# Patient Record
Sex: MALE | Race: WHITE | NOT HISPANIC OR LATINO | URBAN - METROPOLITAN AREA
[De-identification: names, ages, dates, MRNs, and addresses within clinical notes are randomized per-mention and may not be internally consistent; named-entity substitution may affect disease eponyms.]

---

## 2023-02-22 LAB
ALANINE AMINOTRANSFERASE (SGPT) (U/L) IN SER/PLAS: 22 U/L (ref 10–52)
ALBUMIN (G/DL) IN SER/PLAS: 4.4 G/DL (ref 3.4–5)
ALKALINE PHOSPHATASE (U/L) IN SER/PLAS: 65 U/L (ref 33–136)
ANION GAP IN SER/PLAS: 11 MMOL/L (ref 10–20)
ASPARTATE AMINOTRANSFERASE (SGOT) (U/L) IN SER/PLAS: 17 U/L (ref 9–39)
BASOPHILS (10*3/UL) IN BLOOD BY AUTOMATED COUNT: 0.05 X10E9/L (ref 0–0.1)
BASOPHILS/100 LEUKOCYTES IN BLOOD BY AUTOMATED COUNT: 0.9 % (ref 0–2)
BILIRUBIN TOTAL (MG/DL) IN SER/PLAS: 1.1 MG/DL (ref 0–1.2)
CALCIUM (MG/DL) IN SER/PLAS: 9.4 MG/DL (ref 8.6–10.6)
CARBON DIOXIDE, TOTAL (MMOL/L) IN SER/PLAS: 30 MMOL/L (ref 21–32)
CHLORIDE (MMOL/L) IN SER/PLAS: 104 MMOL/L (ref 98–107)
CHOLESTEROL (MG/DL) IN SER/PLAS: 216 MG/DL (ref 0–199)
CHOLESTEROL IN HDL (MG/DL) IN SER/PLAS: 54.2 MG/DL
CHOLESTEROL/HDL RATIO: 4
CREATININE (MG/DL) IN SER/PLAS: 0.69 MG/DL (ref 0.5–1.3)
EOSINOPHILS (10*3/UL) IN BLOOD BY AUTOMATED COUNT: 0.15 X10E9/L (ref 0–0.4)
EOSINOPHILS/100 LEUKOCYTES IN BLOOD BY AUTOMATED COUNT: 2.8 % (ref 0–6)
ERYTHROCYTE DISTRIBUTION WIDTH (RATIO) BY AUTOMATED COUNT: 12.8 % (ref 11.5–14.5)
ERYTHROCYTE MEAN CORPUSCULAR HEMOGLOBIN CONCENTRATION (G/DL) BY AUTOMATED: 32.4 G/DL (ref 32–36)
ERYTHROCYTE MEAN CORPUSCULAR VOLUME (FL) BY AUTOMATED COUNT: 92 FL (ref 80–100)
ERYTHROCYTES (10*6/UL) IN BLOOD BY AUTOMATED COUNT: 4.88 X10E12/L (ref 4.5–5.9)
GFR MALE: >90 ML/MIN/1.73M2
GLUCOSE (MG/DL) IN SER/PLAS: 82 MG/DL (ref 74–99)
HEMATOCRIT (%) IN BLOOD BY AUTOMATED COUNT: 45.1 % (ref 41–52)
HEMOGLOBIN (G/DL) IN BLOOD: 14.6 G/DL (ref 13.5–17.5)
IMMATURE GRANULOCYTES/100 LEUKOCYTES IN BLOOD BY AUTOMATED COUNT: 0.2 % (ref 0–0.9)
LDL: 132 MG/DL (ref 0–99)
LEUKOCYTES (10*3/UL) IN BLOOD BY AUTOMATED COUNT: 5.3 X10E9/L (ref 4.4–11.3)
LYMPHOCYTES (10*3/UL) IN BLOOD BY AUTOMATED COUNT: 1.44 X10E9/L (ref 0.8–3)
LYMPHOCYTES/100 LEUKOCYTES IN BLOOD BY AUTOMATED COUNT: 27 % (ref 13–44)
MONOCYTES (10*3/UL) IN BLOOD BY AUTOMATED COUNT: 0.73 X10E9/L (ref 0.05–0.8)
MONOCYTES/100 LEUKOCYTES IN BLOOD BY AUTOMATED COUNT: 13.7 % (ref 2–10)
NEUTROPHILS (10*3/UL) IN BLOOD BY AUTOMATED COUNT: 2.96 X10E9/L (ref 1.6–5.5)
NEUTROPHILS/100 LEUKOCYTES IN BLOOD BY AUTOMATED COUNT: 55.4 % (ref 40–80)
NRBC (PER 100 WBCS) BY AUTOMATED COUNT: 0 /100 WBC (ref 0–0)
PLATELETS (10*3/UL) IN BLOOD AUTOMATED COUNT: 246 X10E9/L (ref 150–450)
POTASSIUM (MMOL/L) IN SER/PLAS: 4.5 MMOL/L (ref 3.5–5.3)
PROSTATE SPECIFIC AG (NG/ML) IN SER/PLAS: 3.75 NG/ML (ref 0–4)
PROTEIN TOTAL: 6.4 G/DL (ref 6.4–8.2)
SODIUM (MMOL/L) IN SER/PLAS: 140 MMOL/L (ref 136–145)
THYROTROPIN (MIU/L) IN SER/PLAS BY DETECTION LIMIT <= 0.05 MIU/L: 1.46 MIU/L (ref 0.44–3.98)
TRIGLYCERIDE (MG/DL) IN SER/PLAS: 147 MG/DL (ref 0–149)
UREA NITROGEN (MG/DL) IN SER/PLAS: 17 MG/DL (ref 6–23)
VLDL: 29 MG/DL (ref 0–40)

## 2023-10-20 ENCOUNTER — LAB (OUTPATIENT)
Dept: LAB | Facility: LAB | Age: 78
End: 2023-10-20
Payer: COMMERCIAL

## 2023-10-20 DIAGNOSIS — E03.9 HYPOTHYROIDISM, UNSPECIFIED: ICD-10-CM

## 2023-10-20 DIAGNOSIS — E03.9 HYPOTHYROIDISM, UNSPECIFIED: Primary | ICD-10-CM

## 2023-10-20 LAB
CHOLEST SERPL-MCNC: 244 MG/DL (ref 0–199)
CHOLESTEROL/HDL RATIO: 4.7
HDLC SERPL-MCNC: 51.7 MG/DL
LDLC SERPL CALC-MCNC: 161 MG/DL
NON HDL CHOLESTEROL: 192 MG/DL (ref 0–149)
TRIGL SERPL-MCNC: 156 MG/DL (ref 0–149)
VLDL: 31 MG/DL (ref 0–40)

## 2023-10-20 PROCEDURE — 80061 LIPID PANEL: CPT

## 2024-04-11 ENCOUNTER — OFFICE VISIT (OUTPATIENT)
Dept: PRIMARY CARE | Facility: CLINIC | Age: 79
End: 2024-04-11
Payer: MEDICARE

## 2024-04-11 VITALS
HEIGHT: 65 IN | HEART RATE: 70 BPM | BODY MASS INDEX: 28.49 KG/M2 | SYSTOLIC BLOOD PRESSURE: 115 MMHG | WEIGHT: 171 LBS | OXYGEN SATURATION: 93 % | TEMPERATURE: 97.8 F | RESPIRATION RATE: 18 BRPM | DIASTOLIC BLOOD PRESSURE: 74 MMHG

## 2024-04-11 DIAGNOSIS — R53.83 MALAISE AND FATIGUE: ICD-10-CM

## 2024-04-11 DIAGNOSIS — Z12.5 SCREENING PSA (PROSTATE SPECIFIC ANTIGEN): ICD-10-CM

## 2024-04-11 DIAGNOSIS — E78.5 DYSLIPIDEMIA: ICD-10-CM

## 2024-04-11 DIAGNOSIS — R53.81 MALAISE AND FATIGUE: ICD-10-CM

## 2024-04-11 DIAGNOSIS — I10 PRIMARY HYPERTENSION: ICD-10-CM

## 2024-04-11 DIAGNOSIS — Z00.00 ROUTINE GENERAL MEDICAL EXAMINATION AT HEALTH CARE FACILITY: Primary | ICD-10-CM

## 2024-04-11 DIAGNOSIS — G20.A1 IDIOPATHIC PARKINSON'S DISEASE (MULTI): ICD-10-CM

## 2024-04-11 DIAGNOSIS — K21.00 GASTROESOPHAGEAL REFLUX DISEASE WITH ESOPHAGITIS WITHOUT HEMORRHAGE: ICD-10-CM

## 2024-04-11 DIAGNOSIS — E55.9 VITAMIN D DEFICIENCY: ICD-10-CM

## 2024-04-11 LAB
25(OH)D3 SERPL-MCNC: 22 NG/ML (ref 30–100)
ALBUMIN SERPL BCP-MCNC: 3.8 G/DL (ref 3.4–5)
ALP SERPL-CCNC: 58 U/L (ref 33–136)
ALT SERPL W P-5'-P-CCNC: 20 U/L (ref 10–52)
ANION GAP SERPL CALC-SCNC: 15 MMOL/L (ref 10–20)
AST SERPL W P-5'-P-CCNC: 15 U/L (ref 9–39)
BILIRUB SERPL-MCNC: 0.9 MG/DL (ref 0–1.2)
BUN SERPL-MCNC: 20 MG/DL (ref 6–23)
CALCIUM SERPL-MCNC: 9.2 MG/DL (ref 8.6–10.6)
CHLORIDE SERPL-SCNC: 103 MMOL/L (ref 98–107)
CHOLEST SERPL-MCNC: 167 MG/DL (ref 0–199)
CHOLESTEROL/HDL RATIO: 3.1
CO2 SERPL-SCNC: 27 MMOL/L (ref 21–32)
CREAT SERPL-MCNC: 0.83 MG/DL (ref 0.5–1.3)
EGFRCR SERPLBLD CKD-EPI 2021: 88 ML/MIN/1.73M*2
ERYTHROCYTE [DISTWIDTH] IN BLOOD BY AUTOMATED COUNT: 13.1 % (ref 11.5–14.5)
GLUCOSE SERPL-MCNC: 72 MG/DL (ref 74–99)
HCT VFR BLD AUTO: 45.8 % (ref 41–52)
HDLC SERPL-MCNC: 53.7 MG/DL
HGB BLD-MCNC: 14.7 G/DL (ref 13.5–17.5)
LDLC SERPL CALC-MCNC: 91 MG/DL
MCH RBC QN AUTO: 29.9 PG (ref 26–34)
MCHC RBC AUTO-ENTMCNC: 32.1 G/DL (ref 32–36)
MCV RBC AUTO: 93 FL (ref 80–100)
NON HDL CHOLESTEROL: 113 MG/DL (ref 0–149)
NRBC BLD-RTO: 0 /100 WBCS (ref 0–0)
PLATELET # BLD AUTO: 230 X10*3/UL (ref 150–450)
POTASSIUM SERPL-SCNC: 4.3 MMOL/L (ref 3.5–5.3)
PROT SERPL-MCNC: 6.2 G/DL (ref 6.4–8.2)
PSA SERPL-MCNC: 4.08 NG/ML
RBC # BLD AUTO: 4.91 X10*6/UL (ref 4.5–5.9)
SODIUM SERPL-SCNC: 141 MMOL/L (ref 136–145)
TRIGL SERPL-MCNC: 110 MG/DL (ref 0–149)
TSH SERPL-ACNC: 1.97 MIU/L (ref 0.44–3.98)
VLDL: 22 MG/DL (ref 0–40)
WBC # BLD AUTO: 5 X10*3/UL (ref 4.4–11.3)

## 2024-04-11 PROCEDURE — 3074F SYST BP LT 130 MM HG: CPT | Performed by: FAMILY MEDICINE

## 2024-04-11 PROCEDURE — 1159F MED LIST DOCD IN RCRD: CPT | Performed by: FAMILY MEDICINE

## 2024-04-11 PROCEDURE — 1170F FXNL STATUS ASSESSED: CPT | Performed by: FAMILY MEDICINE

## 2024-04-11 PROCEDURE — G0103 PSA SCREENING: HCPCS

## 2024-04-11 PROCEDURE — 85027 COMPLETE CBC AUTOMATED: CPT

## 2024-04-11 PROCEDURE — 1126F AMNT PAIN NOTED NONE PRSNT: CPT | Performed by: FAMILY MEDICINE

## 2024-04-11 PROCEDURE — 80061 LIPID PANEL: CPT

## 2024-04-11 PROCEDURE — 36415 COLL VENOUS BLD VENIPUNCTURE: CPT

## 2024-04-11 PROCEDURE — G0439 PPPS, SUBSEQ VISIT: HCPCS | Performed by: FAMILY MEDICINE

## 2024-04-11 PROCEDURE — 99214 OFFICE O/P EST MOD 30 MIN: CPT | Performed by: FAMILY MEDICINE

## 2024-04-11 PROCEDURE — 3078F DIAST BP <80 MM HG: CPT | Performed by: FAMILY MEDICINE

## 2024-04-11 PROCEDURE — 80053 COMPREHEN METABOLIC PANEL: CPT

## 2024-04-11 PROCEDURE — 1036F TOBACCO NON-USER: CPT | Performed by: FAMILY MEDICINE

## 2024-04-11 PROCEDURE — 84443 ASSAY THYROID STIM HORMONE: CPT

## 2024-04-11 PROCEDURE — 82306 VITAMIN D 25 HYDROXY: CPT

## 2024-04-11 PROCEDURE — 1160F RVW MEDS BY RX/DR IN RCRD: CPT | Performed by: FAMILY MEDICINE

## 2024-04-11 RX ORDER — CARBIDOPA AND LEVODOPA 25; 100 MG/1; MG/1
1 TABLET ORAL 2 TIMES DAILY
COMMUNITY
Start: 2023-11-13

## 2024-04-11 RX ORDER — PANTOPRAZOLE SODIUM 40 MG/1
40 TABLET, DELAYED RELEASE ORAL DAILY
Qty: 30 TABLET | Refills: 5 | Status: SHIPPED | OUTPATIENT
Start: 2024-04-11 | End: 2024-10-08

## 2024-04-11 RX ORDER — AMANTADINE HYDROCHLORIDE 100 MG/1
100 CAPSULE, GELATIN COATED ORAL DAILY
COMMUNITY

## 2024-04-11 RX ORDER — ATORVASTATIN CALCIUM 20 MG/1
1 TABLET, FILM COATED ORAL DAILY
COMMUNITY
End: 2024-06-05

## 2024-04-11 ASSESSMENT — ACTIVITIES OF DAILY LIVING (ADL)
TAKING_MEDICATION: INDEPENDENT
DOING_HOUSEWORK: INDEPENDENT
MANAGING_FINANCES: INDEPENDENT
BATHING: INDEPENDENT
GROCERY_SHOPPING: INDEPENDENT
DRESSING: INDEPENDENT

## 2024-04-11 ASSESSMENT — PATIENT HEALTH QUESTIONNAIRE - PHQ9
2. FEELING DOWN, DEPRESSED OR HOPELESS: NOT AT ALL
1. LITTLE INTEREST OR PLEASURE IN DOING THINGS: NOT AT ALL
SUM OF ALL RESPONSES TO PHQ9 QUESTIONS 1 AND 2: 0

## 2024-04-11 ASSESSMENT — ENCOUNTER SYMPTOMS
OCCASIONAL FEELINGS OF UNSTEADINESS: 0
DEPRESSION: 0
LOSS OF SENSATION IN FEET: 0

## 2024-04-11 ASSESSMENT — PAIN SCALES - GENERAL: PAINLEVEL: 0-NO PAIN

## 2024-04-11 NOTE — PROGRESS NOTES
"Subjective   Reason for Visit: Tom Velasquez is an 79 y.o. male here for a Medicare Wellness visit.     Past Medical, Surgical, and Family History reviewed and updated in chart.    Reviewed all medications by prescribing practitioner or clinical pharmacist (such as prescriptions, OTCs, herbal therapies and supplements) and documented in the medical record.    HPI  : Patient is an 79-year-old male who was evaluated today for his Medicare wellness exam.  Patient will answer the questions as presented by the medical assistant today.  His wife states that he does have a living will and medical power of .  He mainly speaks Slov[place] and he is with his wife who does interpret for him.  He has idiopathic Parkinson's disease and does see neurology at the [facility].  He is on Sinemet and Symmetrel.  He is able to ambulate without any significant difficulty and he does have a mild tremor.  His concern today is with some reflux esophagitis and he does need to complete blood work.  Patient otherwise stays very active for the age of 79 and his wife states that he is always doing something to stay busy.  The medications do bother his stomach a little bit and it does cause him some fatigue.  He does follow every 4 to 6 months with neurology.    Patient Care Team:  Derrick Dodson DO as PCP - General (Family Medicine)  Jose Osman MD as PCP - [employer] PCP     Review of Systems    Objective   Vitals:  /74   Pulse 70   Temp 36.6 °C (97.8 °F)   Resp 18   Ht 1.651 m (5' 5\")   Wt 77.6 kg (171 lb)   SpO2 93%   BMI 28.46 kg/m²       Physical Exam  Vitals and nursing note reviewed.   Constitutional:       Appearance: Normal appearance. He is normal weight.      Comments: Patient is alert and oriented x3.   No acute distress but does have Parkinson's and has a mild tremor.   HENT:      Head: Normocephalic.      Right Ear: Tympanic membrane and external ear normal.      Left Ear: Tympanic " membrane and external ear normal.      Ears:      Comments: Ears are patent bilaterally and TMs are clear.     Nose: Nose normal.      Mouth/Throat:      Mouth: Mucous membranes are moist.      Pharynx: Oropharynx is clear.      Comments: Mouth is moist, tongue is midline.  No posterior pharyngeal erythema.  Eyes:      Extraocular Movements: Extraocular movements intact.      Conjunctiva/sclera: Conjunctivae normal.      Pupils: Pupils are equal, round, and reactive to light.      Comments: No visual disturbance, does have his eyes examined once a year.   Neck:      Comments: Mild restriction of motion cervical spine secondary to arthritis.  No carotid bruits, no thyromegaly, no cervical adenopathy.   Cardiovascular:      Rate and Rhythm: Normal rate and regular rhythm.      Pulses: Normal pulses.      Heart sounds: Normal heart sounds.      Comments: Patient denies chest pain and no palpitations.  Heart rhythm is stable S1 and S2 are noted, no ectopics.  Pulmonary:      Effort: Pulmonary effort is normal.      Breath sounds: Normal breath sounds.      Comments: Patient denies any coughing or wheezing.  Lungs are clear to auscultation.    Abdominal:      General: Bowel sounds are normal.      Palpations: Abdomen is soft.      Comments: Abdomen is soft and nontender with some epigastric reflux symptoms.  Patient denies lower abdominal pains or guarding.  No flank tenderness.  Patient would like some medication for reflux and heartburn.   Genitourinary:     Comments: Patient denies dysuria, no hematuria, no nocturia, denies flank pain.  Musculoskeletal:         General: Normal range of motion.      Cervical back: Normal range of motion.      Comments: Patient complains of some arthritis in his hips and knees but overall stays active and does try to keep busy at home.  Occasional cervical and lumbar muscle spasm.  Denies any radicular pain down the legs.  Ambulates without any assistive device.  No ankle edema.    Skin:     General: Skin is warm and dry.      Comments: There is no bruising, no erythema, no rashes.  Patient does have dry skin.  Few scattered keratotic age spots.   Neurological:      General: No focal deficit present.      Mental Status: He is alert and oriented to person, place, and time. Mental status is at baseline.      Comments: Patient does have a Parkinson's tremor but it is very mild in the upper extremities.  If he gets nervous the tremors get worse.  Can get up from a seated position and ambulation is very good with his current medications.  No focal neurosensory deficits are noted.  Patient denies any peripheral neuropathy.  Coordination and gait are stable.  Normal muscle strength upper and lower extremities.   Psychiatric:         Behavior: Behavior normal.         Thought Content: Thought content normal.         Judgment: Judgment normal.      Comments: Patient has flat mood and affect.  Patient also has Parkinson's facies.  Thought content and judgment are stable.  Memory and speech seem intact.  No signs of vascular dementia.  Behavior is normal.     PLAN : Patient is an 79-year-old male who was evaluated today for a Medicare wellness exam.  He did have complete blood work drawn and will be notified of the results and 4 days.  He did answer the questions as presented by the medical assistant and he does have a living will and medical power of .  He mainly speaks Upper sorbian and his wife is with him to interpret.  He also goes to the [facility] for his Parkinson's disease which is very mild.  I reviewed his previous blood results and his medications and he will follow-up as needed pending his blood work results.  He otherwise is stable and will call to make appointments as needed.    Assessment/Plan   Problem List Items Addressed This Visit    None  Visit Diagnoses       Routine general medical examination at health care facility    -  Primary    Primary hypertension        Relevant  Orders    CBC    Comprehensive Metabolic Panel    Lipid Panel    Thyroid Stimulating Hormone    Dyslipidemia        Relevant Orders    CBC    Comprehensive Metabolic Panel    Lipid Panel    Thyroid Stimulating Hormone    Malaise and fatigue        Relevant Orders    CBC    Comprehensive Metabolic Panel    Lipid Panel    Thyroid Stimulating Hormone    Vitamin D deficiency        Relevant Orders    Vitamin D 25-Hydroxy,Total (for eval of Vitamin D levels)    Screening PSA (prostate specific antigen)        Relevant Orders    Prostate Specific Antigen, Screen    Gastroesophageal reflux disease with esophagitis without hemorrhage

## 2024-04-12 DIAGNOSIS — E55.9 VITAMIN D DEFICIENCY: Primary | ICD-10-CM

## 2024-04-12 RX ORDER — ERGOCALCIFEROL 1.25 MG/1
50000 CAPSULE ORAL
Qty: 4 CAPSULE | Refills: 1 | Status: SHIPPED | OUTPATIENT
Start: 2024-04-14 | End: 2024-06-03

## 2024-04-12 NOTE — RESULT ENCOUNTER NOTE
Blood sugar was a little bit low at 72    probably from fasting for the blood test  .    Kidney and liver function are normal         protein was a little bit low at 6.2     that is probably dietary and he should try to  eat  a   little more protein     vitamin D is low at 22     it should be above 30    I will send in some prescription vitamin D to his pharmacy    to take once per week       his PSA level is just borderline     at 4.08     and should be under 4         I will discuss this and recheck it next time he comes in      it is probably due to his age.     Cholesterol is very good at 167       triglycerides are normal at 110    thyroid function is normal         red and white blood cell counts are normal  -overall blood work is very stable for his age             and I will just send in some prescription vitamin D to take once per week

## 2024-06-01 DIAGNOSIS — E55.9 VITAMIN D DEFICIENCY: ICD-10-CM

## 2024-06-03 RX ORDER — ERGOCALCIFEROL 1.25 MG/1
50000 CAPSULE ORAL
Qty: 4 CAPSULE | Refills: 1 | Status: SHIPPED | OUTPATIENT
Start: 2024-06-09

## 2024-06-04 DIAGNOSIS — E78.5 DYSLIPIDEMIA: Primary | ICD-10-CM

## 2024-06-05 RX ORDER — ATORVASTATIN CALCIUM 20 MG/1
20 TABLET, FILM COATED ORAL DAILY
Qty: 90 TABLET | Refills: 3 | Status: SHIPPED | OUTPATIENT
Start: 2024-06-05

## 2024-07-15 ENCOUNTER — APPOINTMENT (OUTPATIENT)
Dept: PRIMARY CARE | Facility: CLINIC | Age: 79
End: 2024-07-15
Payer: MEDICARE

## 2024-07-15 VITALS
TEMPERATURE: 97.2 F | HEART RATE: 72 BPM | OXYGEN SATURATION: 95 % | RESPIRATION RATE: 18 BRPM | HEIGHT: 65 IN | SYSTOLIC BLOOD PRESSURE: 119 MMHG | WEIGHT: 176 LBS | BODY MASS INDEX: 29.32 KG/M2 | DIASTOLIC BLOOD PRESSURE: 71 MMHG

## 2024-07-15 DIAGNOSIS — R53.83 MALAISE AND FATIGUE: ICD-10-CM

## 2024-07-15 DIAGNOSIS — E78.5 DYSLIPIDEMIA: Primary | ICD-10-CM

## 2024-07-15 DIAGNOSIS — G20.A1 IDIOPATHIC PARKINSON'S DISEASE (MULTI): ICD-10-CM

## 2024-07-15 DIAGNOSIS — I10 PRIMARY HYPERTENSION: ICD-10-CM

## 2024-07-15 DIAGNOSIS — R97.20 ELEVATED PSA: ICD-10-CM

## 2024-07-15 DIAGNOSIS — N32.81 OVERACTIVE BLADDER: ICD-10-CM

## 2024-07-15 DIAGNOSIS — R53.81 MALAISE AND FATIGUE: ICD-10-CM

## 2024-07-15 LAB
ALBUMIN SERPL BCP-MCNC: 4.1 G/DL (ref 3.4–5)
ALP SERPL-CCNC: 64 U/L (ref 33–136)
ALT SERPL W P-5'-P-CCNC: 13 U/L (ref 10–52)
ANION GAP SERPL CALC-SCNC: 13 MMOL/L (ref 10–20)
AST SERPL W P-5'-P-CCNC: 16 U/L (ref 9–39)
BILIRUB SERPL-MCNC: 0.8 MG/DL (ref 0–1.2)
BUN SERPL-MCNC: 20 MG/DL (ref 6–23)
CALCIUM SERPL-MCNC: 9.2 MG/DL (ref 8.6–10.6)
CHLORIDE SERPL-SCNC: 102 MMOL/L (ref 98–107)
CHOLEST SERPL-MCNC: 152 MG/DL (ref 0–199)
CHOLESTEROL/HDL RATIO: 2.8
CO2 SERPL-SCNC: 28 MMOL/L (ref 21–32)
CREAT SERPL-MCNC: 0.78 MG/DL (ref 0.5–1.3)
EGFRCR SERPLBLD CKD-EPI 2021: 90 ML/MIN/1.73M*2
ERYTHROCYTE [DISTWIDTH] IN BLOOD BY AUTOMATED COUNT: 12.9 % (ref 11.5–14.5)
GLUCOSE SERPL-MCNC: 94 MG/DL (ref 74–99)
HCT VFR BLD AUTO: 47.4 % (ref 41–52)
HDLC SERPL-MCNC: 53.4 MG/DL
HGB BLD-MCNC: 14.9 G/DL (ref 13.5–17.5)
LDLC SERPL CALC-MCNC: 61 MG/DL
MCH RBC QN AUTO: 29.4 PG (ref 26–34)
MCHC RBC AUTO-ENTMCNC: 31.4 G/DL (ref 32–36)
MCV RBC AUTO: 94 FL (ref 80–100)
NON HDL CHOLESTEROL: 99 MG/DL (ref 0–149)
NRBC BLD-RTO: 0 /100 WBCS (ref 0–0)
PLATELET # BLD AUTO: 245 X10*3/UL (ref 150–450)
POTASSIUM SERPL-SCNC: 4.2 MMOL/L (ref 3.5–5.3)
PROT SERPL-MCNC: 6.3 G/DL (ref 6.4–8.2)
RBC # BLD AUTO: 5.07 X10*6/UL (ref 4.5–5.9)
SODIUM SERPL-SCNC: 139 MMOL/L (ref 136–145)
TRIGL SERPL-MCNC: 187 MG/DL (ref 0–149)
VLDL: 37 MG/DL (ref 0–40)
WBC # BLD AUTO: 5.3 X10*3/UL (ref 4.4–11.3)

## 2024-07-15 PROCEDURE — 1160F RVW MEDS BY RX/DR IN RCRD: CPT | Performed by: FAMILY MEDICINE

## 2024-07-15 PROCEDURE — 36415 COLL VENOUS BLD VENIPUNCTURE: CPT

## 2024-07-15 PROCEDURE — 3074F SYST BP LT 130 MM HG: CPT | Performed by: FAMILY MEDICINE

## 2024-07-15 PROCEDURE — 80053 COMPREHEN METABOLIC PANEL: CPT

## 2024-07-15 PROCEDURE — 99214 OFFICE O/P EST MOD 30 MIN: CPT | Performed by: FAMILY MEDICINE

## 2024-07-15 PROCEDURE — 85027 COMPLETE CBC AUTOMATED: CPT

## 2024-07-15 PROCEDURE — 80061 LIPID PANEL: CPT

## 2024-07-15 PROCEDURE — 1036F TOBACCO NON-USER: CPT | Performed by: FAMILY MEDICINE

## 2024-07-15 PROCEDURE — 1126F AMNT PAIN NOTED NONE PRSNT: CPT | Performed by: FAMILY MEDICINE

## 2024-07-15 PROCEDURE — 1159F MED LIST DOCD IN RCRD: CPT | Performed by: FAMILY MEDICINE

## 2024-07-15 PROCEDURE — 3078F DIAST BP <80 MM HG: CPT | Performed by: FAMILY MEDICINE

## 2024-07-15 ASSESSMENT — PATIENT HEALTH QUESTIONNAIRE - PHQ9
SUM OF ALL RESPONSES TO PHQ9 QUESTIONS 1 AND 2: 0
2. FEELING DOWN, DEPRESSED OR HOPELESS: NOT AT ALL
1. LITTLE INTEREST OR PLEASURE IN DOING THINGS: NOT AT ALL

## 2024-07-15 ASSESSMENT — PAIN SCALES - GENERAL: PAINLEVEL: 0-NO PAIN

## 2024-07-15 NOTE — PROGRESS NOTES
Subjective   Tom Velasquez is a 79 y.o. male who presents for Follow-up (Follow up visit to check cholesterol level).    HPI ; patient is an 79-year-old male present for follow-up visit and recheck on cholesterol and discussion about his Parkinson's disease.  He also had a mildly elevated PSA last visit and he wants to discuss seeing a urologist.  He would like a referral since he has overactive bladder issues and nocturia.  He has a mild right tremor in the right arm and does have Parkinson's type facies, he is on Sinemet from neurology.      Objective  : ROS :10 systems were reviewed and the information is included in the HPI and no additional review of systems is indicated.    Physical Exam  Vitals and nursing note reviewed.   Constitutional:       Appearance: Normal appearance.      Comments: Patient is alert and oriented x3.   No acute distress   HENT:      Head: Normocephalic.      Right Ear: Tympanic membrane and external ear normal.      Left Ear: Tympanic membrane and external ear normal.      Ears:      Comments: Ears are patent bilaterally and TMs are clear.     Nose: Nose normal.      Mouth/Throat:      Mouth: Mucous membranes are moist.      Pharynx: Oropharynx is clear.      Comments: Mouth is moist, tongue is midline.  No posterior pharyngeal erythema.  Eyes:      Extraocular Movements: Extraocular movements intact.      Conjunctiva/sclera: Conjunctivae normal.      Pupils: Pupils are equal, round, and reactive to light.      Comments: No visual disturbance, does have his eyes examined once a year.   Neck:      Comments: Moderate restriction of motion cervical spine due to arthritis, and spasm.  No carotid bruits, no thyromegaly, no cervical adenopathy.    Cardiovascular:      Rate and Rhythm: Normal rate and regular rhythm.      Pulses: Normal pulses.      Heart sounds: Normal heart sounds.      Comments: Patient denies chest pain and no palpitations.  Heart rhythm is stable S1 and S2 are noted, no  ectopics.  Pulmonary:      Effort: Pulmonary effort is normal.      Breath sounds: Normal breath sounds.      Comments: Denies any shortness of breath.  Patient denies any coughing or wheezing.  Lungs are clear to auscultation.    Abdominal:      General: Bowel sounds are normal.      Palpations: Abdomen is soft.      Comments: Abdomen is soft and nontender,  No flank tenderness.  No suprapubic pain.  Positive bowel sounds x4.  No abdominal guarding and no rebound tenderness.   Genitourinary:     Comments: Slightly elevated PSA and patient does have overactive bladder and nocturia.  Will refer to urology.  Musculoskeletal:         General: Normal range of motion.      Cervical back: Normal range of motion.      Comments: Age-related arthritis in the joints.  Patient does try to exercise on a regular basis due to his Parkinson's disease.  Mild restriction of motion cervical and lumbar spines due to arthritis, and muscle spasm.   Skin:     General: Skin is warm.      Comments: There is no bruising, no erythema, no skin lesions noted, no rashes.   Neurological:      General: No focal deficit present.      Mental Status: He is alert and oriented to person, place, and time. Mental status is at baseline.      Comments: History of Parkinson's disease and has a right arm tremor and Parkinson's facies.  He does follow with neurology and is on Sinemet.  Ambulation is stable and there is no tripping or stumbling.   Psychiatric:         Mood and Affect: Mood normal.         Behavior: Behavior normal.         Thought Content: Thought content normal.         Judgment: Judgment normal.      Comments: Patient has flat mood and affect.  Thought content and judgment are stable.  No signs of vascular dementia.  Behavior is normal.     PLAN : Patient is an 79-year-old male who was reevaluated today in follow-up to check his cholesterol, blood counts and review of his medications.  He did have a slightly elevated PSA in the past and  will be referred to allergy.  He also has overactive bladder and some nocturia.  He is accompanied with his wife and since he has Parkinson's disease he does follow with neurology and is on Sinemet.  He does have a mild right hand and arm tremor.  His balance is stable on the is not tripping or stumbling.  He does not need any assistive device.  He will be notified of his blood results when available and follow-up in 6 months pending the blood work results.    Problem List Items Addressed This Visit       Malaise and fatigue    Relevant Orders    CBC    Comprehensive Metabolic Panel    Dyslipidemia    Relevant Orders    CBC    Comprehensive Metabolic Panel    Primary hypertension    Relevant Orders    CBC    Comprehensive Metabolic Panel            Derrick Dodson, DO

## 2024-07-18 NOTE — RESULT ENCOUNTER NOTE
Blood sugar, kidney and liver function are normal       cholesterol is good at 152     triglycerides are borderline at 187    should be under 150     just watch the fats in the diet.    Red   And white blood cell counts are normal       blood work looks stable     just watch the fats in the diet.    He does not need to return for 6 months

## 2024-08-23 PROBLEM — H61.21 IMPACTED CERUMEN OF RIGHT EAR: Status: ACTIVE | Noted: 2024-08-23

## 2024-08-23 PROBLEM — H93.8X1 SENSATION OF PLUGGED EAR ON RIGHT SIDE: Status: ACTIVE | Noted: 2024-08-23

## 2024-08-23 PROBLEM — M25.519 SHOULDER PAIN: Status: ACTIVE | Noted: 2024-08-23

## 2024-08-23 PROBLEM — G20.A1 IDIOPATHIC PARKINSON'S DISEASE (MULTI): Status: ACTIVE | Noted: 2022-09-05

## 2024-08-23 PROBLEM — H90.3 BILATERAL SENSORINEURAL HEARING LOSS: Status: ACTIVE | Noted: 2024-08-23

## 2024-08-23 PROBLEM — M79.2 NERVE PAIN: Status: ACTIVE | Noted: 2024-08-23

## 2024-08-23 RX ORDER — TAMSULOSIN HYDROCHLORIDE 0.4 MG/1
1 CAPSULE ORAL
COMMUNITY
Start: 2023-11-08

## 2024-08-24 NOTE — PROGRESS NOTES
Subjective   Patient ID: Tom Velasquez is a 79 y.o. male who presents for No chief complaint on file..    HPI  PT PRESENTS FOR EVALUATION OF VOIDING DYSFUNCTION  Are you experiencing:  Burning on urination -- NO  Pain on urination  --NO  Urinary frequency -- NO  Urinary urgency -- NO  Urge incontinence -- NO  NO ENEURESIS   Nocturia-- 2-3 X ON AVG  Hematuria -- NO  Hesitancy --NO  Post void fullness --   NO    Review of Systems  General-- No C/O fever or chills  Head-- No C/O Dizziness  Eyes-- NO  C/O blurry or double vision  Ears-- No C/O hearing loss  Neck-- Supple  Chest-- No C/O pain or discomfort  Lungs-- No C/O shortness of breath  Abdomen-- No C/O  pain or discomfort, No nausea or vomiting  Back-- No C/O back pain or discomfort  Extremities-- No C/O swelling or pain    Objective   Physical Exam    General-- well developed, well nourished in NAD  Head-- normal cephalic, atraumatic  Eyes-- PERRL, EOM'S FROM,  no  jaundice  Neck-- Supple, without masses  Chest-- Normal bony structure  Abdomen-- soft, non tender, liver spleen not palpable . No supra pubic masses  Back-- no flank masses palpable, no CVA tenderness on palpation or perc;ussion  Lymph nodes-- No inguinal lymphadenopathy noted  Prostate-- 3+, firm, smooth, non tender,without nodules  Testis-- both down, non tender, without masses  Epididymis-- no masses palpable  Scrotum -- no hydrocele noted  Extremities -- Normal muscle mass and tone for the patients age  Neurological-- oriented times three--PT HAS A TREMOR OF BOTH HANDS      PSA  4-11-24 -- 4.08  PVR-- 0 ML    Urinalysis dipstick--grossly within normal limits    Assessment/Plan   A:  NORMAL FEELING PROSTATE with mild voiding symptoms  NORMAL PVR  NORMAL U/A  PSA is at the upper limits of normal  There is no history of prostate cancer in the family  Pathophysiology of the above discussed in detail with the patient and his son  Options of further therapy and/or evaluation discussed in detail  All  questions answered    H/O PARKINSON'S-- THIS WILL OCC CAUSE VOIDING DYSFUNCTION  P:  REASSURANCE, EDUCATION RENDERED TO THE PT  We will treat the patient's current voiding symptoms AND THE PSA  conservatively for now--both the patient and son are comfortable with this plan of therapy  WILL SEE THE PT BACK ON AN OPEN DOOR POLICY  Patient will follow-up with Dr. Dodson per his routine     Chente Whaley MD 08/24/24 3:33 PM

## 2024-08-26 ENCOUNTER — OFFICE VISIT (OUTPATIENT)
Dept: UROLOGY | Facility: CLINIC | Age: 79
End: 2024-08-26
Payer: MEDICARE

## 2024-08-26 VITALS
HEIGHT: 65 IN | BODY MASS INDEX: 29.16 KG/M2 | WEIGHT: 175 LBS | DIASTOLIC BLOOD PRESSURE: 73 MMHG | RESPIRATION RATE: 16 BRPM | SYSTOLIC BLOOD PRESSURE: 137 MMHG | TEMPERATURE: 98.4 F | HEART RATE: 94 BPM

## 2024-08-26 DIAGNOSIS — R97.20 ELEVATED PSA: ICD-10-CM

## 2024-08-26 DIAGNOSIS — N13.8 BPH WITH URINARY OBSTRUCTION: Primary | ICD-10-CM

## 2024-08-26 DIAGNOSIS — N32.81 OVERACTIVE BLADDER: ICD-10-CM

## 2024-08-26 DIAGNOSIS — N40.1 BPH WITH URINARY OBSTRUCTION: Primary | ICD-10-CM

## 2024-08-26 DIAGNOSIS — Z13.89 SCREENING FOR BLOOD OR PROTEIN IN URINE: ICD-10-CM

## 2024-08-26 DIAGNOSIS — R35.1 NOCTURIA: ICD-10-CM

## 2024-08-26 LAB
POC APPEARANCE, URINE: CLEAR
POC BILIRUBIN, URINE: ABNORMAL
POC BLOOD, URINE: NEGATIVE
POC COLOR, URINE: YELLOW
POC GLUCOSE, URINE: NEGATIVE MG/DL
POC KETONES, URINE: ABNORMAL MG/DL
POC LEUKOCYTES, URINE: NEGATIVE
POC NITRITE,URINE: NEGATIVE
POC PH, URINE: 6 PH
POC PROTEIN, URINE: NEGATIVE MG/DL
POC SPECIFIC GRAVITY, URINE: >=1.03
POC UROBILINOGEN, URINE: 0.2 EU/DL

## 2024-08-26 PROCEDURE — 1036F TOBACCO NON-USER: CPT | Performed by: UROLOGY

## 2024-08-26 PROCEDURE — 51798 US URINE CAPACITY MEASURE: CPT | Performed by: UROLOGY

## 2024-08-26 PROCEDURE — 99204 OFFICE O/P NEW MOD 45 MIN: CPT | Performed by: UROLOGY

## 2024-08-26 PROCEDURE — 1160F RVW MEDS BY RX/DR IN RCRD: CPT | Performed by: UROLOGY

## 2024-08-26 PROCEDURE — 99214 OFFICE O/P EST MOD 30 MIN: CPT | Performed by: UROLOGY

## 2024-08-26 PROCEDURE — 1159F MED LIST DOCD IN RCRD: CPT | Performed by: UROLOGY

## 2024-08-26 PROCEDURE — 1126F AMNT PAIN NOTED NONE PRSNT: CPT | Performed by: UROLOGY

## 2024-08-26 PROCEDURE — 3075F SYST BP GE 130 - 139MM HG: CPT | Performed by: UROLOGY

## 2024-08-26 PROCEDURE — 81003 URINALYSIS AUTO W/O SCOPE: CPT | Performed by: UROLOGY

## 2024-08-26 PROCEDURE — 3078F DIAST BP <80 MM HG: CPT | Performed by: UROLOGY

## 2024-08-26 SDOH — ECONOMIC STABILITY: FOOD INSECURITY: WITHIN THE PAST 12 MONTHS, THE FOOD YOU BOUGHT JUST DIDN'T LAST AND YOU DIDN'T HAVE MONEY TO GET MORE.: NEVER TRUE

## 2024-08-26 SDOH — ECONOMIC STABILITY: FOOD INSECURITY: WITHIN THE PAST 12 MONTHS, YOU WORRIED THAT YOUR FOOD WOULD RUN OUT BEFORE YOU GOT MONEY TO BUY MORE.: NEVER TRUE

## 2024-08-26 ASSESSMENT — PAIN SCALES - GENERAL: PAINLEVEL: 0-NO PAIN

## 2024-08-26 ASSESSMENT — LIFESTYLE VARIABLES
HOW OFTEN DO YOU HAVE A DRINK CONTAINING ALCOHOL: NEVER
HOW MANY STANDARD DRINKS CONTAINING ALCOHOL DO YOU HAVE ON A TYPICAL DAY: PATIENT DOES NOT DRINK
SKIP TO QUESTIONS 9-10: 1
AUDIT-C TOTAL SCORE: 0
HOW OFTEN DO YOU HAVE SIX OR MORE DRINKS ON ONE OCCASION: NEVER

## 2024-08-26 ASSESSMENT — COLUMBIA-SUICIDE SEVERITY RATING SCALE - C-SSRS
6. HAVE YOU EVER DONE ANYTHING, STARTED TO DO ANYTHING, OR PREPARED TO DO ANYTHING TO END YOUR LIFE?: NO
1. IN THE PAST MONTH, HAVE YOU WISHED YOU WERE DEAD OR WISHED YOU COULD GO TO SLEEP AND NOT WAKE UP?: NO
2. HAVE YOU ACTUALLY HAD ANY THOUGHTS OF KILLING YOURSELF?: NO

## 2024-08-26 ASSESSMENT — PATIENT HEALTH QUESTIONNAIRE - PHQ9
2. FEELING DOWN, DEPRESSED OR HOPELESS: NOT AT ALL
SUM OF ALL RESPONSES TO PHQ9 QUESTIONS 1 AND 2: 0
1. LITTLE INTEREST OR PLEASURE IN DOING THINGS: NOT AT ALL

## 2024-08-26 ASSESSMENT — ENCOUNTER SYMPTOMS
OCCASIONAL FEELINGS OF UNSTEADINESS: 0
LOSS OF SENSATION IN FEET: 0

## 2024-08-26 NOTE — PROGRESS NOTES
Subjective   Patient ID: Tom Velasquez is a 79 y.o. male who presents for Urinary Frequency (Patient presents for urinary frequency.).    HPI  PT PRESENTS FOR EVALUATION OF VOIDING DYSFUNCTION  Are you experiencing:  Burning on urination -- NO  Pain on urination  --NO  Urinary frequency -- NO  Urinary urgency -- NO  Urge incontinence -- NO  NO ENEURESIS   Nocturia-- 2-3 X ON AVG  Hematuria -- NO  Hesitancy --NO  Post void fullness --   NO    Review of Systems  General-- No C/O fever or chills  Head-- No C/O Dizziness  Eyes-- NO  C/O blurry or double vision  Ears-- No C/O hearing loss  Neck-- Supple  Chest-- No C/O pain or discomfort  Lungs-- No C/O shortness of breath  Abdomen-- No C/O  pain or discomfort, No nausea or vomiting  Back-- No C/O back pain or discomfort  Extremities-- No C/O swelling or pain    Objective   Physical Exam    General-- well developed, well nourished in NAD  Head-- normal cephalic, atraumatic  Eyes-- PERRL, EOM'S FROM,  no  jaundice  Neck-- Supple, without masses  Chest-- Normal bony structure  Abdomen-- soft, non tender, liver spleen not palpable . No supra pubic masses  Back-- no flank masses palpable, no CVA tenderness on palpation or perc;ussion  Lymph nodes-- No inguinal lymphadenopathy noted  Prostate-- 3+, firm, smooth, non tender,without nodules  Testis-- both down, non tender, without masses  Epididymis-- no masses palpable  Scrotum -- no hydrocele noted  Extremities -- Normal muscle mass and tone for the patients age  Neurological-- oriented times three--PT HAS A TREMOR OF BOTH HANDS    PVR-- 0 ML    Assessment/Plan   A:  NORMAL FEELING PROSTATE  NORMAL PVR  NORMAL U/A    H/O PARKINSON'S-- THIS WILL OCC CAUSE VOIDING DYSFUNCTION  P:  REASSURANCE, EDUCATION RENDERED TO THE PT  WILL SEE THE PT BACK ON AN OPEN DOOR POLICY     Antonia Yee LPN 08/26/24 12:32 PM

## 2024-08-26 NOTE — LETTER
August 30, 2024     Derrick Dodson DO  [address] [facility] [id number]    Patient: Tom Velasquez   YOB: 1945   Date of Visit: 8/26/2024       Dear Dr. Derrick Dodson DO:    Thank you for referring Tom Velasquez to me for evaluation. Below are my notes for this consultation.  If you have questions, please do not hesitate to call me. I look forward to following your patient along with you.       Sincerely,     Chente Whaley MD      CC: No Recipients  ______________________________________________________________________________________    Subjective  Patient ID: Tom Velasquez is a 79 y.o. male who presents for No chief complaint on file..    HPI  PT PRESENTS FOR EVALUATION OF VOIDING DYSFUNCTION  Are you experiencing:  Burning on urination -- NO  Pain on urination  --NO  Urinary frequency -- NO  Urinary urgency -- NO  Urge incontinence -- NO  NO ENEURESIS   Nocturia-- 2-3 X ON AVG  Hematuria -- NO  Hesitancy --NO  Post void fullness --   NO    Review of Systems  General-- No C/O fever or chills  Head-- No C/O Dizziness  Eyes-- NO  C/O blurry or double vision  Ears-- No C/O hearing loss  Neck-- Supple  Chest-- No C/O pain or discomfort  Lungs-- No C/O shortness of breath  Abdomen-- No C/O  pain or discomfort, No nausea or vomiting  Back-- No C/O back pain or discomfort  Extremities-- No C/O swelling or pain    Objective   Physical Exam    General-- well developed, well nourished in NAD  Head-- normal cephalic, atraumatic  Eyes-- PERRL, EOM'S FROM,  no  jaundice  Neck-- Supple, without masses  Chest-- Normal bony structure  Abdomen-- soft, non tender, liver spleen not palpable . No supra pubic masses  Back-- no flank masses palpable, no CVA tenderness on palpation or perc;ussion  Lymph nodes-- No inguinal lymphadenopathy noted  Prostate-- 3+, firm, smooth, non tender,without nodules  Testis-- both down, non tender, without masses  Epididymis-- no masses palpable  Scrotum -- no hydrocele  noted  Extremities -- Normal muscle mass and tone for the patients age  Neurological-- oriented times three--PT HAS A TREMOR OF BOTH HANDS      PSA  4-11-24 -- 4.08  PVR-- 0 ML    Urinalysis dipstick--grossly within normal limits    Assessment/Plan   A:  NORMAL FEELING PROSTATE with mild voiding symptoms  NORMAL PVR  NORMAL U/A  PSA is at the upper limits of normal  There is no history of prostate cancer in the family  Pathophysiology of the above discussed in detail with the patient and his son  Options of further therapy and/or evaluation discussed in detail  All questions answered    H/O PARKINSON'S-- THIS WILL OCC CAUSE VOIDING DYSFUNCTION  P:  REASSURANCE, EDUCATION RENDERED TO THE PT  We will treat the patient's current voiding symptoms AND THE PSA  conservatively for now--both the patient and son are comfortable with this plan of therapy  WILL SEE THE PT BACK ON AN OPEN DOOR POLICY  Patient will follow-up with Dr. Dodson per his routine     Chente Whaley MD 08/24/24 3:33 PM

## 2024-10-01 DIAGNOSIS — K21.00 GASTROESOPHAGEAL REFLUX DISEASE WITH ESOPHAGITIS WITHOUT HEMORRHAGE: ICD-10-CM

## 2024-10-01 RX ORDER — PANTOPRAZOLE SODIUM 40 MG/1
40 TABLET, DELAYED RELEASE ORAL DAILY
Qty: 30 TABLET | Refills: 5 | Status: SHIPPED | OUTPATIENT
Start: 2024-10-01

## 2024-10-07 ENCOUNTER — APPOINTMENT (OUTPATIENT)
Dept: RADIOLOGY | Facility: HOSPITAL | Age: 79
DRG: 694 | End: 2024-10-07
Payer: MEDICARE

## 2024-10-07 ENCOUNTER — HOSPITAL ENCOUNTER (INPATIENT)
Facility: HOSPITAL | Age: 79
LOS: 1 days | Discharge: HOME | DRG: 694 | End: 2024-10-08
Attending: EMERGENCY MEDICINE | Admitting: STUDENT IN AN ORGANIZED HEALTH CARE EDUCATION/TRAINING PROGRAM
Payer: MEDICARE

## 2024-10-07 DIAGNOSIS — R33.9 URINARY RETENTION: Primary | ICD-10-CM

## 2024-10-07 DIAGNOSIS — N20.0 KIDNEY STONE: ICD-10-CM

## 2024-10-07 DIAGNOSIS — N17.9 AKI (ACUTE KIDNEY INJURY) (CMS-HCC): ICD-10-CM

## 2024-10-07 LAB
ALBUMIN SERPL BCP-MCNC: 4 G/DL (ref 3.4–5)
ALP SERPL-CCNC: 76 U/L (ref 33–136)
ALT SERPL W P-5'-P-CCNC: 11 U/L (ref 10–52)
ANION GAP SERPL CALC-SCNC: 17 MMOL/L (ref 10–20)
APPEARANCE UR: CLEAR
AST SERPL W P-5'-P-CCNC: 31 U/L (ref 9–39)
BASOPHILS # BLD AUTO: 0.02 X10*3/UL (ref 0–0.1)
BASOPHILS NFR BLD AUTO: 0.2 %
BILIRUB SERPL-MCNC: 1.6 MG/DL (ref 0–1.2)
BILIRUB UR STRIP.AUTO-MCNC: NEGATIVE MG/DL
BUN SERPL-MCNC: 30 MG/DL (ref 6–23)
CALCIUM SERPL-MCNC: 8.9 MG/DL (ref 8.6–10.3)
CHLORIDE SERPL-SCNC: 100 MMOL/L (ref 98–107)
CO2 SERPL-SCNC: 23 MMOL/L (ref 21–32)
COLOR UR: ABNORMAL
CREAT SERPL-MCNC: 2.16 MG/DL (ref 0.5–1.3)
EGFRCR SERPLBLD CKD-EPI 2021: 30 ML/MIN/1.73M*2
EOSINOPHIL # BLD AUTO: 0 X10*3/UL (ref 0–0.4)
EOSINOPHIL NFR BLD AUTO: 0 %
ERYTHROCYTE [DISTWIDTH] IN BLOOD BY AUTOMATED COUNT: 12.4 % (ref 11.5–14.5)
GLUCOSE BLD MANUAL STRIP-MCNC: 87 MG/DL (ref 74–99)
GLUCOSE BLD MANUAL STRIP-MCNC: 99 MG/DL (ref 74–99)
GLUCOSE SERPL-MCNC: 161 MG/DL (ref 74–99)
GLUCOSE UR STRIP.AUTO-MCNC: NORMAL MG/DL
HCT VFR BLD AUTO: 45.6 % (ref 41–52)
HGB BLD-MCNC: 15.6 G/DL (ref 13.5–17.5)
HOLD SPECIMEN: NORMAL
HYALINE CASTS #/AREA URNS AUTO: ABNORMAL /LPF
IMM GRANULOCYTES # BLD AUTO: 0.07 X10*3/UL (ref 0–0.5)
IMM GRANULOCYTES NFR BLD AUTO: 0.6 % (ref 0–0.9)
KETONES UR STRIP.AUTO-MCNC: NEGATIVE MG/DL
LEUKOCYTE ESTERASE UR QL STRIP.AUTO: NEGATIVE
LYMPHOCYTES # BLD AUTO: 0.39 X10*3/UL (ref 0.8–3)
LYMPHOCYTES NFR BLD AUTO: 3.2 %
MCH RBC QN AUTO: 30.3 PG (ref 26–34)
MCHC RBC AUTO-ENTMCNC: 34.2 G/DL (ref 32–36)
MCV RBC AUTO: 89 FL (ref 80–100)
MONOCYTES # BLD AUTO: 0.99 X10*3/UL (ref 0.05–0.8)
MONOCYTES NFR BLD AUTO: 8.2 %
NEUTROPHILS # BLD AUTO: 10.64 X10*3/UL (ref 1.6–5.5)
NEUTROPHILS NFR BLD AUTO: 87.8 %
NITRITE UR QL STRIP.AUTO: NEGATIVE
NRBC BLD-RTO: 0 /100 WBCS (ref 0–0)
PH UR STRIP.AUTO: 5 [PH]
PLATELET # BLD AUTO: 207 X10*3/UL (ref 150–450)
POTASSIUM SERPL-SCNC: 4.5 MMOL/L (ref 3.5–5.3)
PROT SERPL-MCNC: 6.4 G/DL (ref 6.4–8.2)
PROT UR STRIP.AUTO-MCNC: NEGATIVE MG/DL
RBC # BLD AUTO: 5.15 X10*6/UL (ref 4.5–5.9)
RBC # UR STRIP.AUTO: ABNORMAL /UL
RBC #/AREA URNS AUTO: >20 /HPF
SODIUM SERPL-SCNC: 135 MMOL/L (ref 136–145)
SP GR UR STRIP.AUTO: 1.01
UROBILINOGEN UR STRIP.AUTO-MCNC: NORMAL MG/DL
WBC # BLD AUTO: 12.1 X10*3/UL (ref 4.4–11.3)
WBC #/AREA URNS AUTO: ABNORMAL /HPF

## 2024-10-07 PROCEDURE — 82947 ASSAY GLUCOSE BLOOD QUANT: CPT

## 2024-10-07 PROCEDURE — 81001 URINALYSIS AUTO W/SCOPE: CPT | Performed by: PHYSICIAN ASSISTANT

## 2024-10-07 PROCEDURE — 99285 EMERGENCY DEPT VISIT HI MDM: CPT

## 2024-10-07 PROCEDURE — 51702 INSERT TEMP BLADDER CATH: CPT

## 2024-10-07 PROCEDURE — 2500000004 HC RX 250 GENERAL PHARMACY W/ HCPCS (ALT 636 FOR OP/ED): Performed by: PHYSICIAN ASSISTANT

## 2024-10-07 PROCEDURE — 80053 COMPREHEN METABOLIC PANEL: CPT | Performed by: PHYSICIAN ASSISTANT

## 2024-10-07 PROCEDURE — 2500000002 HC RX 250 W HCPCS SELF ADMINISTERED DRUGS (ALT 637 FOR MEDICARE OP, ALT 636 FOR OP/ED): Performed by: PHYSICIAN ASSISTANT

## 2024-10-07 PROCEDURE — 85025 COMPLETE CBC W/AUTO DIFF WBC: CPT | Performed by: PHYSICIAN ASSISTANT

## 2024-10-07 PROCEDURE — 2500000001 HC RX 250 WO HCPCS SELF ADMINISTERED DRUGS (ALT 637 FOR MEDICARE OP): Performed by: PHYSICIAN ASSISTANT

## 2024-10-07 PROCEDURE — G0378 HOSPITAL OBSERVATION PER HR: HCPCS

## 2024-10-07 PROCEDURE — 36415 COLL VENOUS BLD VENIPUNCTURE: CPT | Performed by: PHYSICIAN ASSISTANT

## 2024-10-07 PROCEDURE — 99222 1ST HOSP IP/OBS MODERATE 55: CPT | Performed by: PHYSICIAN ASSISTANT

## 2024-10-07 PROCEDURE — 74176 CT ABD & PELVIS W/O CONTRAST: CPT | Mod: FOREIGN READ | Performed by: RADIOLOGY

## 2024-10-07 PROCEDURE — 81003 URINALYSIS AUTO W/O SCOPE: CPT | Performed by: PHYSICIAN ASSISTANT

## 2024-10-07 PROCEDURE — 51798 US URINE CAPACITY MEASURE: CPT

## 2024-10-07 PROCEDURE — 1100000001 HC PRIVATE ROOM DAILY

## 2024-10-07 PROCEDURE — 96361 HYDRATE IV INFUSION ADD-ON: CPT

## 2024-10-07 PROCEDURE — 96360 HYDRATION IV INFUSION INIT: CPT

## 2024-10-07 PROCEDURE — 74176 CT ABD & PELVIS W/O CONTRAST: CPT

## 2024-10-07 RX ORDER — SODIUM CHLORIDE, SODIUM LACTATE, POTASSIUM CHLORIDE, CALCIUM CHLORIDE 600; 310; 30; 20 MG/100ML; MG/100ML; MG/100ML; MG/100ML
100 INJECTION, SOLUTION INTRAVENOUS CONTINUOUS
Status: ACTIVE | OUTPATIENT
Start: 2024-10-07 | End: 2024-10-08

## 2024-10-07 RX ORDER — CARBIDOPA AND LEVODOPA 25; 100 MG/1; MG/1
1.5 TABLET ORAL 3 TIMES DAILY
Status: DISCONTINUED | OUTPATIENT
Start: 2024-10-07 | End: 2024-10-08 | Stop reason: HOSPADM

## 2024-10-07 RX ORDER — ATORVASTATIN CALCIUM 20 MG/1
20 TABLET, FILM COATED ORAL DAILY
Status: DISCONTINUED | OUTPATIENT
Start: 2024-10-07 | End: 2024-10-08 | Stop reason: HOSPADM

## 2024-10-07 RX ORDER — POLYETHYLENE GLYCOL 3350 17 G/17G
17 POWDER, FOR SOLUTION ORAL DAILY PRN
Status: DISCONTINUED | OUTPATIENT
Start: 2024-10-07 | End: 2024-10-08 | Stop reason: HOSPADM

## 2024-10-07 RX ORDER — TAMSULOSIN HYDROCHLORIDE 0.4 MG/1
0.4 CAPSULE ORAL
Status: DISCONTINUED | OUTPATIENT
Start: 2024-10-07 | End: 2024-10-08 | Stop reason: HOSPADM

## 2024-10-07 RX ORDER — AMANTADINE HYDROCHLORIDE 100 MG/1
100 CAPSULE, GELATIN COATED ORAL DAILY
Status: DISCONTINUED | OUTPATIENT
Start: 2024-10-07 | End: 2024-10-08 | Stop reason: HOSPADM

## 2024-10-07 RX ORDER — ACETAMINOPHEN 650 MG/1
650 SUPPOSITORY RECTAL EVERY 4 HOURS PRN
Status: DISCONTINUED | OUTPATIENT
Start: 2024-10-07 | End: 2024-10-08 | Stop reason: HOSPADM

## 2024-10-07 RX ORDER — TALC
3 POWDER (GRAM) TOPICAL NIGHTLY PRN
Status: DISCONTINUED | OUTPATIENT
Start: 2024-10-07 | End: 2024-10-08 | Stop reason: HOSPADM

## 2024-10-07 RX ORDER — ACETAMINOPHEN 325 MG/1
650 TABLET ORAL EVERY 4 HOURS PRN
Status: DISCONTINUED | OUTPATIENT
Start: 2024-10-07 | End: 2024-10-08 | Stop reason: HOSPADM

## 2024-10-07 RX ORDER — PANTOPRAZOLE SODIUM 40 MG/1
40 TABLET, DELAYED RELEASE ORAL DAILY
Status: DISCONTINUED | OUTPATIENT
Start: 2024-10-07 | End: 2024-10-08 | Stop reason: HOSPADM

## 2024-10-07 RX ORDER — ACETAMINOPHEN 160 MG/5ML
650 SOLUTION ORAL EVERY 4 HOURS PRN
Status: DISCONTINUED | OUTPATIENT
Start: 2024-10-07 | End: 2024-10-08 | Stop reason: HOSPADM

## 2024-10-07 RX ADMIN — AMANTADINE HYDROCHLORIDE 100 MG: 100 CAPSULE ORAL at 12:44

## 2024-10-07 RX ADMIN — CARBIDOPA AND LEVODOPA 1.5 TABLET: 25; 100 TABLET ORAL at 21:05

## 2024-10-07 RX ADMIN — TAMSULOSIN HYDROCHLORIDE 0.4 MG: 0.4 CAPSULE ORAL at 19:00

## 2024-10-07 RX ADMIN — CARBIDOPA AND LEVODOPA 1.5 TABLET: 25; 100 TABLET ORAL at 12:44

## 2024-10-07 RX ADMIN — TAMSULOSIN HYDROCHLORIDE 0.4 MG: 0.4 CAPSULE ORAL at 12:46

## 2024-10-07 RX ADMIN — SODIUM CHLORIDE, POTASSIUM CHLORIDE, SODIUM LACTATE AND CALCIUM CHLORIDE 1000 ML: 600; 310; 30; 20 INJECTION, SOLUTION INTRAVENOUS at 09:32

## 2024-10-07 RX ADMIN — PANTOPRAZOLE SODIUM 40 MG: 40 TABLET, DELAYED RELEASE ORAL at 12:46

## 2024-10-07 RX ADMIN — SODIUM CHLORIDE, POTASSIUM CHLORIDE, SODIUM LACTATE AND CALCIUM CHLORIDE 100 ML/HR: 600; 310; 30; 20 INJECTION, SOLUTION INTRAVENOUS at 12:46

## 2024-10-07 SDOH — ECONOMIC STABILITY: INCOME INSECURITY: IN THE PAST 12 MONTHS, HAS THE ELECTRIC, GAS, OIL, OR WATER COMPANY THREATENED TO SHUT OFF SERVICE IN YOUR HOME?: NO

## 2024-10-07 SDOH — ECONOMIC STABILITY: FOOD INSECURITY: HOW HARD IS IT FOR YOU TO PAY FOR THE VERY BASICS LIKE FOOD, HOUSING, MEDICAL CARE, AND HEATING?: NOT VERY HARD

## 2024-10-07 SDOH — SOCIAL STABILITY: SOCIAL INSECURITY: WITHIN THE LAST YEAR, HAVE YOU BEEN HUMILIATED OR EMOTIONALLY ABUSED IN OTHER WAYS BY YOUR PARTNER OR EX-PARTNER?: NO

## 2024-10-07 SDOH — ECONOMIC STABILITY: FOOD INSECURITY: WITHIN THE PAST 12 MONTHS, YOU WORRIED THAT YOUR FOOD WOULD RUN OUT BEFORE YOU GOT THE MONEY TO BUY MORE.: NEVER TRUE

## 2024-10-07 SDOH — ECONOMIC STABILITY: HOUSING INSECURITY: IN THE LAST 12 MONTHS, WAS THERE A TIME WHEN YOU WERE NOT ABLE TO PAY THE MORTGAGE OR RENT ON TIME?: NO

## 2024-10-07 SDOH — ECONOMIC STABILITY: FOOD INSECURITY: WITHIN THE PAST 12 MONTHS, YOU WORRIED THAT YOUR FOOD WOULD RUN OUT BEFORE YOU GOT MONEY TO BUY MORE.: NEVER TRUE

## 2024-10-07 SDOH — SOCIAL STABILITY: SOCIAL INSECURITY: ARE YOU OR HAVE YOU BEEN THREATENED OR ABUSED PHYSICALLY, EMOTIONALLY, OR SEXUALLY BY ANYONE?: NO

## 2024-10-07 SDOH — ECONOMIC STABILITY: INCOME INSECURITY: IN THE LAST 12 MONTHS, WAS THERE A TIME WHEN YOU WERE NOT ABLE TO PAY THE MORTGAGE OR RENT ON TIME?: NO

## 2024-10-07 SDOH — ECONOMIC STABILITY: FOOD INSECURITY: WITHIN THE PAST 12 MONTHS, THE FOOD YOU BOUGHT JUST DIDN'T LAST AND YOU DIDN'T HAVE MONEY TO GET MORE.: NEVER TRUE

## 2024-10-07 SDOH — ECONOMIC STABILITY: HOUSING INSECURITY: IN THE PAST 12 MONTHS, HOW MANY TIMES HAVE YOU MOVED WHERE YOU WERE LIVING?: 1

## 2024-10-07 SDOH — SOCIAL STABILITY: SOCIAL INSECURITY: WITHIN THE LAST YEAR, HAVE YOU BEEN AFRAID OF YOUR PARTNER OR EX-PARTNER?: NO

## 2024-10-07 SDOH — SOCIAL STABILITY: SOCIAL INSECURITY: DOES ANYONE TRY TO KEEP YOU FROM HAVING/CONTACTING OTHER FRIENDS OR DOING THINGS OUTSIDE YOUR HOME?: NO

## 2024-10-07 SDOH — SOCIAL STABILITY: SOCIAL INSECURITY
WITHIN THE LAST YEAR, HAVE TO BEEN RAPED OR FORCED TO HAVE ANY KIND OF SEXUAL ACTIVITY BY YOUR PARTNER OR EX-PARTNER?: NO

## 2024-10-07 SDOH — SOCIAL STABILITY: SOCIAL INSECURITY
WITHIN THE LAST YEAR, HAVE YOU BEEN RAPED OR FORCED TO HAVE ANY KIND OF SEXUAL ACTIVITY BY YOUR PARTNER OR EX-PARTNER?: NO

## 2024-10-07 SDOH — SOCIAL STABILITY: SOCIAL INSECURITY: HAS ANYONE EVER THREATENED TO HURT YOUR FAMILY OR YOUR PETS?: NO

## 2024-10-07 SDOH — ECONOMIC STABILITY: INCOME INSECURITY: IN THE PAST 12 MONTHS HAS THE ELECTRIC, GAS, OIL, OR WATER COMPANY THREATENED TO SHUT OFF SERVICES IN YOUR HOME?: NO

## 2024-10-07 SDOH — SOCIAL STABILITY: SOCIAL INSECURITY: HAVE YOU HAD ANY THOUGHTS OF HARMING ANYONE ELSE?: NO

## 2024-10-07 SDOH — SOCIAL STABILITY: SOCIAL INSECURITY: HAVE YOU HAD THOUGHTS OF HARMING ANYONE ELSE?: NO

## 2024-10-07 SDOH — ECONOMIC STABILITY: TRANSPORTATION INSECURITY: IN THE PAST 12 MONTHS, HAS LACK OF TRANSPORTATION KEPT YOU FROM MEDICAL APPOINTMENTS OR FROM GETTING MEDICATIONS?: NO

## 2024-10-07 SDOH — ECONOMIC STABILITY: HOUSING INSECURITY: AT ANY TIME IN THE PAST 12 MONTHS, WERE YOU HOMELESS OR LIVING IN A SHELTER (INCLUDING NOW)?: NO

## 2024-10-07 SDOH — ECONOMIC STABILITY: INCOME INSECURITY: HOW HARD IS IT FOR YOU TO PAY FOR THE VERY BASICS LIKE FOOD, HOUSING, MEDICAL CARE, AND HEATING?: NOT VERY HARD

## 2024-10-07 SDOH — SOCIAL STABILITY: SOCIAL INSECURITY
WITHIN THE LAST YEAR, HAVE YOU BEEN KICKED, HIT, SLAPPED, OR OTHERWISE PHYSICALLY HURT BY YOUR PARTNER OR EX-PARTNER?: NO

## 2024-10-07 SDOH — SOCIAL STABILITY: SOCIAL INSECURITY: ARE THERE ANY APPARENT SIGNS OF INJURIES/BEHAVIORS THAT COULD BE RELATED TO ABUSE/NEGLECT?: NO

## 2024-10-07 SDOH — SOCIAL STABILITY: SOCIAL INSECURITY: WERE YOU ABLE TO COMPLETE ALL THE BEHAVIORAL HEALTH SCREENINGS?: YES

## 2024-10-07 SDOH — ECONOMIC STABILITY: TRANSPORTATION INSECURITY
IN THE PAST 12 MONTHS, HAS THE LACK OF TRANSPORTATION KEPT YOU FROM MEDICAL APPOINTMENTS OR FROM GETTING MEDICATIONS?: NO

## 2024-10-07 SDOH — SOCIAL STABILITY: SOCIAL INSECURITY: DO YOU FEEL UNSAFE GOING BACK TO THE PLACE WHERE YOU ARE LIVING?: NO

## 2024-10-07 SDOH — ECONOMIC STABILITY: TRANSPORTATION INSECURITY
IN THE PAST 12 MONTHS, HAS LACK OF TRANSPORTATION KEPT YOU FROM MEETINGS, WORK, OR FROM GETTING THINGS NEEDED FOR DAILY LIVING?: NO

## 2024-10-07 SDOH — SOCIAL STABILITY: SOCIAL INSECURITY: ABUSE: ADULT

## 2024-10-07 SDOH — SOCIAL STABILITY: SOCIAL INSECURITY: DO YOU FEEL ANYONE HAS EXPLOITED OR TAKEN ADVANTAGE OF YOU FINANCIALLY OR OF YOUR PERSONAL PROPERTY?: NO

## 2024-10-07 ASSESSMENT — ENCOUNTER SYMPTOMS
PALPITATIONS: 0
CHEST TIGHTNESS: 0
DIAPHORESIS: 0
JOINT SWELLING: 0
DIZZINESS: 0
SHORTNESS OF BREATH: 0
SORE THROAT: 0
DIARRHEA: 0
ABDOMINAL PAIN: 1
WHEEZING: 0
CONFUSION: 0
CONSTIPATION: 0
NAUSEA: 0
VOMITING: 0
FEVER: 0
CHILLS: 0
HEMATURIA: 1
HALLUCINATIONS: 0
COUGH: 0
HEADACHES: 0
DYSURIA: 1

## 2024-10-07 ASSESSMENT — LIFESTYLE VARIABLES
EVER HAD A DRINK FIRST THING IN THE MORNING TO STEADY YOUR NERVES TO GET RID OF A HANGOVER: NO
HAVE YOU EVER FELT YOU SHOULD CUT DOWN ON YOUR DRINKING: NO
TOTAL SCORE: 0
SUBSTANCE_ABUSE_PAST_12_MONTHS: NO
AUDIT-C TOTAL SCORE: 0
SKIP TO QUESTIONS 9-10: 1
HOW MANY STANDARD DRINKS CONTAINING ALCOHOL DO YOU HAVE ON A TYPICAL DAY: PATIENT DOES NOT DRINK
HOW OFTEN DO YOU HAVE 6 OR MORE DRINKS ON ONE OCCASION: NEVER
HAVE PEOPLE ANNOYED YOU BY CRITICIZING YOUR DRINKING: NO
AUDIT-C TOTAL SCORE: 0
EVER FELT BAD OR GUILTY ABOUT YOUR DRINKING: NO
HOW OFTEN DO YOU HAVE A DRINK CONTAINING ALCOHOL: NEVER
PRESCIPTION_ABUSE_PAST_12_MONTHS: NO

## 2024-10-07 ASSESSMENT — COGNITIVE AND FUNCTIONAL STATUS - GENERAL
MOBILITY SCORE: 18
DAILY ACTIVITIY SCORE: 20
WALKING IN HOSPITAL ROOM: A LITTLE
TOILETING: A LITTLE
TURNING FROM BACK TO SIDE WHILE IN FLAT BAD: A LITTLE
CLIMB 3 TO 5 STEPS WITH RAILING: A LOT
DRESSING REGULAR LOWER BODY CLOTHING: A LITTLE
HELP NEEDED FOR BATHING: A LITTLE
STANDING UP FROM CHAIR USING ARMS: A LITTLE
MOVING TO AND FROM BED TO CHAIR: A LITTLE
PATIENT BASELINE BEDBOUND: NO
DRESSING REGULAR UPPER BODY CLOTHING: A LITTLE

## 2024-10-07 ASSESSMENT — ACTIVITIES OF DAILY LIVING (ADL)
HEARING - LEFT EAR: FUNCTIONAL
HEARING - RIGHT EAR: FUNCTIONAL
DRESSING YOURSELF: NEEDS ASSISTANCE
GROOMING: INDEPENDENT
WALKS IN HOME: NEEDS ASSISTANCE
BATHING: NEEDS ASSISTANCE
JUDGMENT_ADEQUATE_SAFELY_COMPLETE_DAILY_ACTIVITIES: YES
LACK_OF_TRANSPORTATION: NO
LACK_OF_TRANSPORTATION: NO
FEEDING YOURSELF: INDEPENDENT
PATIENT'S MEMORY ADEQUATE TO SAFELY COMPLETE DAILY ACTIVITIES?: YES
TOILETING: NEEDS ASSISTANCE
ADEQUATE_TO_COMPLETE_ADL: YES

## 2024-10-07 ASSESSMENT — PATIENT HEALTH QUESTIONNAIRE - PHQ9
SUM OF ALL RESPONSES TO PHQ9 QUESTIONS 1 & 2: 0
1. LITTLE INTEREST OR PLEASURE IN DOING THINGS: NOT AT ALL
2. FEELING DOWN, DEPRESSED OR HOPELESS: NOT AT ALL

## 2024-10-07 ASSESSMENT — PAIN SCALES - GENERAL
PAINLEVEL_OUTOF10: 0 - NO PAIN
PAINLEVEL_OUTOF10: 0 - NO PAIN

## 2024-10-07 NOTE — ED PROVIDER NOTES
Limitations to History: none  External Records Reviewed  Independent Historians: self, patient's wife  Social determinants affecting care: none    HPI  Tom Velasquez is a 79 y.o. male with history of Parkinson's, who presents to the emergency department with his wife for assessment of urinary retention since last night.  He reports that he has the sensation that he needs to urinate but is unable to do so.  Patient's wife reports that he was constipated at the weekend and she did give him to [place] he was able to move his bowels twice yesterday but this has not improved the urination.  He reports that he does have some lower abdominal pain.  He was started recently on tamsulosin for voiding dysfunction however this is not really helping over the last 24 hours.  Has not had fever or chills per denies nausea or vomiting.  He has no further complaints.    PMH  Past Medical History:   Diagnosis Date    Other specified health status     No pertinent past medical history    Parkinson's disease (Multi) 05/16/2022    Parkinsonism    Unspecified abdominal hernia without obstruction or gangrene     Hernia    reviewed by myself.    Meds  Current Outpatient Medications   Medication Instructions    amantadine (SYMMETREL) 100 mg, oral, Daily    atorvastatin (LIPITOR) 20 mg, oral, Daily    carbidopa-levodopa (Sinemet)  mg tablet 1 tablet, oral, 2 times daily    ergocalciferol (VITAMIN D-2) 50,000 Units, oral, Once Weekly    pantoprazole (PROTONIX) 40 mg, oral, Daily, DO NOT CRUSH CHEW OR SPLIT    tamsulosin (Flomax) 0.4 mg 24 hr capsule 1 capsule, oral, Every 12 hours scheduled (0630,1830)       Allergies  Allergies   Allergen Reactions    Sulfa (Sulfonamide Antibiotics) Unknown and GI Upset    reviewed by myself.    SHx  Social History     Tobacco Use    Smoking status: Never     Passive exposure: Never    Smokeless tobacco: Never   Vaping Use    Vaping status: Never Used   Substance Use Topics    Alcohol use: Never     "Drug use: Never    reviewed by myself.      ------------------------------------------------------------------------------------------------------------------------------------------    /74   Pulse 99   Temp 36.8 °C (98.2 °F) (Temporal)   Resp 20   Ht 1.651 m (5' 5\")   Wt 72.6 kg (160 lb)   SpO2 98%   BMI 26.63 kg/m²     Physical Exam  Vitals and nursing note reviewed.   Constitutional:       Appearance: Normal appearance. He is normal weight.   HENT:      Head: Normocephalic.      Nose: Nose normal.      Mouth/Throat:      Mouth: Mucous membranes are moist.      Pharynx: Oropharynx is clear.   Eyes:      Extraocular Movements: Extraocular movements intact.      Conjunctiva/sclera: Conjunctivae normal.   Cardiovascular:      Rate and Rhythm: Regular rhythm. Tachycardia present.   Pulmonary:      Effort: Pulmonary effort is normal.      Breath sounds: Normal breath sounds.   Abdominal:      General: Abdomen is flat. There is distension.      Palpations: Abdomen is soft.      Tenderness: There is abdominal tenderness in the suprapubic area. There is no guarding or rebound.   Musculoskeletal:         General: Normal range of motion.      Cervical back: Neck supple.   Skin:     General: Skin is warm and dry.   Neurological:      Mental Status: He is alert and oriented to person, place, and time.   Psychiatric:         Attention and Perception: Attention normal.         Mood and Affect: Mood normal.          ------------------------------------------------------------------------------------------------------------------------------------------  Labs  Labs Reviewed   CBC WITH AUTO DIFFERENTIAL - Abnormal       Result Value    WBC 12.1 (*)     nRBC 0.0      RBC 5.15      Hemoglobin 15.6      Hematocrit 45.6      MCV 89      MCH 30.3      MCHC 34.2      RDW 12.4      Platelets 207      Neutrophils % 87.8      Immature Granulocytes %, Automated 0.6      Lymphocytes % 3.2      Monocytes % 8.2      Eosinophils % " 0.0      Basophils % 0.2      Neutrophils Absolute 10.64 (*)     Immature Granulocytes Absolute, Automated 0.07      Lymphocytes Absolute 0.39 (*)     Monocytes Absolute 0.99 (*)     Eosinophils Absolute 0.00      Basophils Absolute 0.02     COMPREHENSIVE METABOLIC PANEL - Abnormal    Glucose 161 (*)     Sodium 135 (*)     Potassium 4.5      Chloride 100      Bicarbonate 23      Anion Gap 17      Urea Nitrogen 30 (*)     Creatinine 2.16 (*)     eGFR 30 (*)     Calcium 8.9      Albumin 4.0      Alkaline Phosphatase 76      Total Protein 6.4      AST 31      Bilirubin, Total 1.6 (*)     ALT 11     URINALYSIS WITH REFLEX CULTURE AND MICROSCOPIC - Abnormal    Color, Urine Light-Yellow      Appearance, Urine Clear      Specific Gravity, Urine 1.007      pH, Urine 5.0      Protein, Urine NEGATIVE      Glucose, Urine Normal      Blood, Urine 1.0 (3+) (*)     Ketones, Urine NEGATIVE      Bilirubin, Urine NEGATIVE      Urobilinogen, Urine Normal      Nitrite, Urine NEGATIVE      Leukocyte Esterase, Urine NEGATIVE     URINALYSIS MICROSCOPIC WITH REFLEX CULTURE - Abnormal    WBC, Urine 1-5      RBC, Urine >20 (*)     Hyaline Casts, Urine OCCASIONAL (*)    URINALYSIS WITH REFLEX CULTURE AND MICROSCOPIC    Narrative:     The following orders were created for panel order Urinalysis with Reflex Culture and Microscopic.  Procedure                               Abnormality         Status                     ---------                               -----------         ------                     Urinalysis with Reflex C...[[id number]]  Abnormal            Final result               Extra Urine Gray Tube[[id number]]                            In process                   Please view results for these tests on the individual orders.   EXTRA URINE GRAY TUBE        Imaging  CT abdomen pelvis wo IV contrast   Final Result   Mild RIGHT hydronephrosis and hydroureter.  There is an approximate 6   mm x 3 mm stone in the expected location of the  RIGHT ureterovesicular   junction.  Sanderson catheter within collapsed urinary bladder.  Correlate   clinically.  No priors are available for comparison.   Enlarged prostate.   Small focus of subsegmental atelectasis LEFT lung base.   Chronic calcification of the splenic capsule.  Question prior trauma.   Signed by Jose Gu MD           ED Course  Diagnoses as of 10/07/24 1018   Urinary retention   NOÉ (acute kidney injury) (CMS-HCC)   Kidney stone        Medical Decision Making: He did not appear ill or toxic.  Vital signs reviewed.  He is hypertensive and tachycardic.  He is otherwise hemodynamically stable.  His abdomen is distended and there is concern for urinary retention.  Nursing staff instructed to perform bladder scan.  Bladder scan shows greater than 600 mL of urine.  Nursing staff instructed to place Sanderson catheter.    Differential diagnoses considered: Urinary retention, BPH, neurogenic bladder, constipation, others    Medications given: IV fluids    I reviewed the labs from today.  Leukocytosis at 12.1.  H&H is stable.  Glucose 161.  Sodium 135.  BUN 30 with a creatinine 2.16.  Urinalysis showing blood but no evidence of UTI.  Patient hydrated with a liter of IV fluids and CT ordered for further assessment of the urinary retention and NOÉ to make there is not obstructive process.  CT showing mild right hydronephrosis and hydroureter with a 6 mm x 3 mm stone in the right UVJ.  I consulted urology.  I spoke with Augustin Hinds who will follow.  I consulted his PCP, Dr. Thomas who will admit.  Case discussed and evaluated with ED attending who is agreeable to patient plan of care. [facility] notified.    Diagnosis: NOÉ, urinary retention, kidney stone  Plan: admit     Jonathan Miranda PA-C  10/07/24 1018

## 2024-10-07 NOTE — H&P
History Of Present Illness  Tom Velasquez is a 79 y.o. male with PMH of PD, HTN, and HLD presented to [facility] ED from home on 10/7/2024 with difficulty urinating.The pt had some abdominal pain on Saturday. He was having some issues with constipation and with urination. He thought it might be constipation so he used some OTC stool softeners. He had 2 BM yesterday but is still having difficulty urinating. It burns when he urinates and has a sensation of fullness. He denies any fever, chills, chest pain, SOB, CHAN, nausea, or vomiting. When he has been able to urinate, his urine was dark in color. He decided to come to the ER. ER evaluation revealed microscopic hematuria on UA, an NOÉ, and a partially obstructing kidney stone in his right kidney. Pt was also bladder scanned and found to have >600cc of urine. A Sanderson catheter was placed in ER. ER recommended the pt be brought into the hospital. He has been accepted on the service of Dr. Thomas with consultation to urology. Pt is very active and goes to [facility] regularly.    Past Medical History  Past Medical History:   Diagnosis Date    Bilateral sensorineural hearing loss 08/23/2024    Dyslipidemia 04/11/2024    Gastroesophageal reflux disease with esophagitis without hemorrhage 04/11/2024    Idiopathic Parkinson's disease (Multi) 09/05/2022    Other specified health status     No pertinent past medical history    Overactive bladder 07/15/2024    Parkinson's disease (Multi) 05/16/2022    Parkinsonism    Primary hypertension 04/11/2024    Right inguinal hernia 10/21/2003    Unspecified abdominal hernia without obstruction or gangrene     Hernia       Surgical History  Past Surgical History:   Procedure Laterality Date    HERNIA REPAIR Right         Social History  Social History     Tobacco Use    Smoking status: Never     Passive exposure: Never    Smokeless tobacco: Never   Vaping Use    Vaping status: Never Used   Substance Use Topics    Alcohol use: Never    Drug use:  Never        Family History  Family History   Problem Relation Name Age of Onset    No Known Problems Father          Allergies  Sulfa (sulfonamide antibiotics)    Review of Systems   Constitutional:  Negative for chills, diaphoresis and fever.   HENT:  Negative for congestion and sore throat.    Eyes:  Negative for visual disturbance.   Respiratory:  Negative for cough, chest tightness, shortness of breath and wheezing.    Cardiovascular:  Negative for chest pain, palpitations and leg swelling.   Gastrointestinal:  Positive for abdominal pain. Negative for constipation, diarrhea, nausea and vomiting.   Endocrine: Negative for polyuria.   Genitourinary:  Positive for dysuria and hematuria.   Musculoskeletal:  Negative for joint swelling.   Skin:  Negative for rash.   Allergic/Immunologic: Negative for immunocompromised state.   Neurological:  Negative for dizziness, syncope and headaches.   Psychiatric/Behavioral:  Negative for confusion and hallucinations.      Physical Exam  Constitutional:       Appearance: Normal appearance. He is normal weight.   HENT:      Head: Normocephalic and atraumatic.      Mouth/Throat:      Mouth: Mucous membranes are moist.   Eyes:      Conjunctiva/sclera: Conjunctivae normal.   Cardiovascular:      Rate and Rhythm: Normal rate and regular rhythm.      Heart sounds: Murmur heard.   Pulmonary:      Effort: No respiratory distress.      Breath sounds: No wheezing, rhonchi or rales.   Abdominal:      General: There is no distension.      Tenderness: There is no abdominal tenderness. There is no guarding.   Musculoskeletal:         General: No deformity.      Cervical back: No rigidity.   Skin:     General: Skin is warm.   Neurological:      General: No focal deficit present.      Mental Status: He is alert and oriented to person, place, and time.   Psychiatric:         Mood and Affect: Mood normal.       Last Recorded Vitals  Visit Vitals  BP (!) 187/94   Pulse (!) 103   Temp 36.8 °C  "(98.2 °F) (Temporal)   Resp 18   Ht 1.651 m (5' 5\")   Wt 72.6 kg (160 lb)   SpO2 95%   BMI 26.63 kg/m²   Smoking Status Never   BSA 1.82 m²        Relevant Results  Results for orders placed or performed during the hospital encounter of 10/07/24 (from the past 24 hour(s))   Urinalysis with Reflex Culture and Microscopic   Result Value Ref Range    Color, Urine Light-Yellow Light-Yellow, Yellow, Dark-Yellow    Appearance, Urine Clear Clear    Specific Gravity, Urine 1.007 1.005 - 1.035    pH, Urine 5.0 5.0, 5.5, 6.0, 6.5, 7.0, 7.5, 8.0    Protein, Urine NEGATIVE NEGATIVE, 10 (TRACE), 20 (TRACE) mg/dL    Glucose, Urine Normal Normal mg/dL    Blood, Urine 1.0 (3+) (A) NEGATIVE    Ketones, Urine NEGATIVE NEGATIVE mg/dL    Bilirubin, Urine NEGATIVE NEGATIVE    Urobilinogen, Urine Normal Normal mg/dL    Nitrite, Urine NEGATIVE NEGATIVE    Leukocyte Esterase, Urine NEGATIVE NEGATIVE   Urinalysis Microscopic   Result Value Ref Range    WBC, Urine 1-5 1-5, NONE /HPF    RBC, Urine >20 (A) NONE, 1-2, 3-5 /HPF    Hyaline Casts, Urine OCCASIONAL (A) NONE /LPF   CBC and Auto Differential   Result Value Ref Range    WBC 12.1 (H) 4.4 - 11.3 x10*3/uL    nRBC 0.0 0.0 - 0.0 /100 WBCs    RBC 5.15 4.50 - 5.90 x10*6/uL    Hemoglobin 15.6 13.5 - 17.5 g/dL    Hematocrit 45.6 41.0 - 52.0 %    MCV 89 80 - 100 fL    MCH 30.3 26.0 - 34.0 pg    MCHC 34.2 32.0 - 36.0 g/dL    RDW 12.4 11.5 - 14.5 %    Platelets 207 150 - 450 x10*3/uL    Neutrophils % 87.8 40.0 - 80.0 %    Immature Granulocytes %, Automated 0.6 0.0 - 0.9 %    Lymphocytes % 3.2 13.0 - 44.0 %    Monocytes % 8.2 2.0 - 10.0 %    Eosinophils % 0.0 0.0 - 6.0 %    Basophils % 0.2 0.0 - 2.0 %    Neutrophils Absolute 10.64 (H) 1.60 - 5.50 x10*3/uL    Immature Granulocytes Absolute, Automated 0.07 0.00 - 0.50 x10*3/uL    Lymphocytes Absolute 0.39 (L) 0.80 - 3.00 x10*3/uL    Monocytes Absolute 0.99 (H) 0.05 - 0.80 x10*3/uL    Eosinophils Absolute 0.00 0.00 - 0.40 x10*3/uL    Basophils " Absolute 0.02 0.00 - 0.10 x10*3/uL   Comprehensive metabolic panel   Result Value Ref Range    Glucose 161 (H) 74 - 99 mg/dL    Sodium 135 (L) 136 - 145 mmol/L    Potassium 4.5 3.5 - 5.3 mmol/L    Chloride 100 98 - 107 mmol/L    Bicarbonate 23 21 - 32 mmol/L    Anion Gap 17 10 - 20 mmol/L    Urea Nitrogen 30 (H) 6 - 23 mg/dL    Creatinine 2.16 (H) 0.50 - 1.30 mg/dL    eGFR 30 (L) >60 mL/min/1.73m*2    Calcium 8.9 8.6 - 10.3 mg/dL    Albumin 4.0 3.4 - 5.0 g/dL    Alkaline Phosphatase 76 33 - 136 U/L    Total Protein 6.4 6.4 - 8.2 g/dL    AST 31 9 - 39 U/L    Bilirubin, Total 1.6 (H) 0.0 - 1.2 mg/dL    ALT 11 10 - 52 U/L        Imaging  CT abdomen pelvis wo IV contrast    Result Date: 10/7/2024  STUDY: CT Abdomen and Pelvis without IV Contrast; 10/7/2024 at 9:05 AM. INDICATION: Acute kidney injury, urinary retention. COMPARISON: None available. ACCESSION NUMBER(S): [id number] ORDERING CLINICIAN: JJ FAULKNER TECHNIQUE: CT of the abdomen and pelvis was performed.  Contiguous axial images were obtained at 3 mm slice thickness through the abdomen and pelvis. Coronal and sagittal reconstructions at 3 mm slice thickness were performed. No intravenous contrast was administered.  Automated mA/kV exposure control was utilized and patient examination was performed in strict accordance with principles of ALARA. FINDINGS: Partially visualized chest showing the heart to be of normal size. No pericardial effusion.  Small focus of subsegmental atelectasis LEFT lung base. Abdomen: No acute abnormality of the stomach is identified. The liver of normal size and contour. No intrahepatic ductal dilatation is identified.  Gallbladder normal.  No acute abnormality of the pancreas.  Chronic calcification of the splenic capsule. Adrenal glands of normal appearance. Mild RIGHT hydronephrosis.  Mild RIGHT hydroureter.  There is an approximate 6 mm x 3 mm stone in the expected location of the RIGHT ureterovesicular junction. LEFT  extrarenal pelvis.  No acute LEFT renal abnormality identified. No retroperitoneal adenopathy. No findings of abdominal aortic aneurysm. No bowel obstruction.  No free air.  No free fluid. There is a normal-appearing appendix. Pelvis: No pelvic free fluid.  No inflammatory change or findings of free air. No findings of pelvic adenopathy.  Prostate gland measuring approximately 6 cm x 6 cm x 7 cm for an estimated volume of 126 cc. Sanderson catheter within the urinary bladder.  Urinary bladder is collapsed. Skeleton: No acute bony process is identified.    Mild RIGHT hydronephrosis and hydroureter.  There is an approximate 6 mm x 3 mm stone in the expected location of the RIGHT ureterovesicular junction.  Sanderson catheter within collapsed urinary bladder.  Correlate clinically.  No priors are available for comparison. Enlarged prostate. Small focus of subsegmental atelectasis LEFT lung base. Chronic calcification of the splenic capsule.  Question prior trauma. Signed by Jose Gu MD       Home Medications  Prior to Admission medications    Medication Sig Start Date End Date Taking? Authorizing Provider   amantadine (Symmetrel) 100 mg capsule Take 1 capsule (100 mg) by mouth once daily.    Historical Provider, MD   atorvastatin (Lipitor) 20 mg tablet TAKE 1 TABLET BY MOUTH EVERY DAY 6/5/24   Derrick Dodson DO   carbidopa-levodopa (Sinemet)  mg tablet Take 1.5 tablets by mouth 3 times a day. 11/13/23   Historical Provider, MD   pantoprazole (ProtoNix) 40 mg EC tablet TAKE 1 TABLET (40 MG) BY MOUTH DAILY DO NOT CRUSH, CHEW, ORSPLIT 10/1/24   Derrick Dodson DO   tamsulosin (Flomax) 0.4 mg 24 hr capsule Take 1 capsule (0.4 mg) by mouth every 12 hours. 11/8/23   Historical Provider, MD   ergocalciferol (Vitamin D-2) 1.25 MG (37276 UT) capsule TAKE 1 CAPSULE (14821 UNITS) BY MOUTH ONE TIME PER WEEK 6/9/24 10/7/24  Derrick Dodson DO   pantoprazole (ProtoNix) 40 mg EC tablet Take 1 tablet (40 mg) by mouth  once daily. Do not crush, chew, or split. 4/11/24 10/1/24  Derrick Dodson, DO       Medications  Scheduled medications  amantadine, 100 mg, oral, Daily  atorvastatin, 20 mg, oral, Daily  carbidopa-levodopa, 1.5 tablet, oral, TID  pantoprazole, 40 mg, oral, Daily  tamsulosin, 0.4 mg, oral, q12h MARISSA      Continuous medications  lactated Ringer's, 100 mL/hr      PRN medications  acetaminophen, 650 mg, q4h PRN   Or  acetaminophen, 650 mg, q4h PRN   Or  acetaminophen, 650 mg, q4h PRN  melatonin, 3 mg, Nightly PRN  polyethylene glycol, 17 g, Daily PRN           Assessment/Plan   Assessment & Plan  Urinary retention    Kidney stone on right side      Nephrolithiasis of right kidney  Right hydroureteronephrosis  Acute urinary retention  NOÉ  Leukocytosis  -HDS  -Sanderson catheter placed for urinary retention>600cc  -Home Flomax 0.4mg BID continued  -Urology consult  -Continue IVF  -Monitor renal function and avoid nephrotoxic meds  -NPO until urology evaluation in case of procedure  -UA does not appear infected but showing microscopic hematuria  -Mild elevation of WBC at 12.1. Afebrile. Pt denies fever, chills, cough, N/V/D. May be reactive to kidney stone. Will get procal.    PD  HTN  HLD  -No acute issues  -Continue home meds  -Does not appear to be on any home antihypertensives. Will monitor BP and tx per pt clinical course.  -Statin continued    I spent 40 minutes in the professional and overall care of this patient.     See additional orders for further plan of care.   Further evaluation and management per attending and consulting physicians.      Bridget Mcgee PA-C  [place] [facility] Staff  [phone]

## 2024-10-07 NOTE — ED TRIAGE NOTES
PT stated that its been difficult to go to bathroom, constipation and urination. Painful  when attempting to urinate, and has feeling of fullness. Is not on any b/p medication.

## 2024-10-07 NOTE — CARE PLAN
The patient's goals for the shift include      The clinical goals for the shift include pt will remain safe and comftorabe throughout the remainder of the shift      Problem: Pain - Adult  Goal: Verbalizes/displays adequate comfort level or baseline comfort level  Outcome: Progressing     Problem: Safety - Adult  Goal: Free from fall injury  Outcome: Progressing     Problem: Discharge Planning  Goal: Discharge to home or other facility with appropriate resources  Outcome: Progressing     Problem: Chronic Conditions and Co-morbidities  Goal: Patient's chronic conditions and co-morbidity symptoms are monitored and maintained or improved  Outcome: Progressing     Problem: Skin  Goal: Participates in plan/prevention/treatment measures  Outcome: Progressing  Goal: Prevent/minimize sheer/friction injuries  Outcome: Progressing

## 2024-10-08 ENCOUNTER — APPOINTMENT (OUTPATIENT)
Dept: RADIOLOGY | Facility: HOSPITAL | Age: 79
DRG: 694 | End: 2024-10-08
Payer: MEDICARE

## 2024-10-08 VITALS
TEMPERATURE: 95.4 F | BODY MASS INDEX: 26.67 KG/M2 | DIASTOLIC BLOOD PRESSURE: 55 MMHG | OXYGEN SATURATION: 94 % | WEIGHT: 160.05 LBS | RESPIRATION RATE: 20 BRPM | SYSTOLIC BLOOD PRESSURE: 104 MMHG | HEART RATE: 81 BPM | HEIGHT: 65 IN

## 2024-10-08 LAB
ANION GAP SERPL CALC-SCNC: 11 MMOL/L (ref 10–20)
BUN SERPL-MCNC: 26 MG/DL (ref 6–23)
CALCIUM SERPL-MCNC: 8.6 MG/DL (ref 8.6–10.3)
CHLORIDE SERPL-SCNC: 106 MMOL/L (ref 98–107)
CO2 SERPL-SCNC: 25 MMOL/L (ref 21–32)
CREAT SERPL-MCNC: 1.61 MG/DL (ref 0.5–1.3)
EGFRCR SERPLBLD CKD-EPI 2021: 43 ML/MIN/1.73M*2
ERYTHROCYTE [DISTWIDTH] IN BLOOD BY AUTOMATED COUNT: 12.8 % (ref 11.5–14.5)
GLUCOSE BLD MANUAL STRIP-MCNC: 92 MG/DL (ref 74–99)
GLUCOSE BLD MANUAL STRIP-MCNC: 93 MG/DL (ref 74–99)
GLUCOSE BLD MANUAL STRIP-MCNC: 98 MG/DL (ref 74–99)
GLUCOSE SERPL-MCNC: 96 MG/DL (ref 74–99)
HCT VFR BLD AUTO: 40.1 % (ref 41–52)
HGB BLD-MCNC: 13.3 G/DL (ref 13.5–17.5)
MCH RBC QN AUTO: 30.6 PG (ref 26–34)
MCHC RBC AUTO-ENTMCNC: 33.2 G/DL (ref 32–36)
MCV RBC AUTO: 92 FL (ref 80–100)
NRBC BLD-RTO: 0 /100 WBCS (ref 0–0)
PLATELET # BLD AUTO: 158 X10*3/UL (ref 150–450)
POTASSIUM SERPL-SCNC: 4.4 MMOL/L (ref 3.5–5.3)
PROCALCITONIN SERPL-MCNC: 0.06 NG/ML
RBC # BLD AUTO: 4.34 X10*6/UL (ref 4.5–5.9)
SODIUM SERPL-SCNC: 138 MMOL/L (ref 136–145)
WBC # BLD AUTO: 8.1 X10*3/UL (ref 4.4–11.3)

## 2024-10-08 PROCEDURE — 80048 BASIC METABOLIC PNL TOTAL CA: CPT | Performed by: PHYSICIAN ASSISTANT

## 2024-10-08 PROCEDURE — 82947 ASSAY GLUCOSE BLOOD QUANT: CPT

## 2024-10-08 PROCEDURE — 2500000002 HC RX 250 W HCPCS SELF ADMINISTERED DRUGS (ALT 637 FOR MEDICARE OP, ALT 636 FOR OP/ED): Performed by: PHYSICIAN ASSISTANT

## 2024-10-08 PROCEDURE — 85027 COMPLETE CBC AUTOMATED: CPT | Performed by: PHYSICIAN ASSISTANT

## 2024-10-08 PROCEDURE — G0378 HOSPITAL OBSERVATION PER HR: HCPCS

## 2024-10-08 PROCEDURE — 99222 1ST HOSP IP/OBS MODERATE 55: CPT | Performed by: STUDENT IN AN ORGANIZED HEALTH CARE EDUCATION/TRAINING PROGRAM

## 2024-10-08 PROCEDURE — 74019 RADEX ABDOMEN 2 VIEWS: CPT | Performed by: RADIOLOGY

## 2024-10-08 PROCEDURE — 99238 HOSP IP/OBS DSCHRG MGMT 30/<: CPT | Performed by: STUDENT IN AN ORGANIZED HEALTH CARE EDUCATION/TRAINING PROGRAM

## 2024-10-08 PROCEDURE — 74018 RADEX ABDOMEN 1 VIEW: CPT

## 2024-10-08 PROCEDURE — 2500000001 HC RX 250 WO HCPCS SELF ADMINISTERED DRUGS (ALT 637 FOR MEDICARE OP): Performed by: PHYSICIAN ASSISTANT

## 2024-10-08 PROCEDURE — 36415 COLL VENOUS BLD VENIPUNCTURE: CPT | Performed by: PHYSICIAN ASSISTANT

## 2024-10-08 PROCEDURE — 84145 PROCALCITONIN (PCT): CPT | Mod: PARLAB | Performed by: PHYSICIAN ASSISTANT

## 2024-10-08 RX ADMIN — CARBIDOPA AND LEVODOPA 1.5 TABLET: 25; 100 TABLET ORAL at 08:03

## 2024-10-08 RX ADMIN — TAMSULOSIN HYDROCHLORIDE 0.4 MG: 0.4 CAPSULE ORAL at 06:17

## 2024-10-08 RX ADMIN — AMANTADINE HYDROCHLORIDE 100 MG: 100 CAPSULE ORAL at 10:07

## 2024-10-08 RX ADMIN — CARBIDOPA AND LEVODOPA 1.5 TABLET: 25; 100 TABLET ORAL at 16:11

## 2024-10-08 RX ADMIN — ATORVASTATIN CALCIUM 20 MG: 20 TABLET, FILM COATED ORAL at 08:03

## 2024-10-08 RX ADMIN — PANTOPRAZOLE SODIUM 40 MG: 40 TABLET, DELAYED RELEASE ORAL at 08:03

## 2024-10-08 ASSESSMENT — ENCOUNTER SYMPTOMS
WHEEZING: 0
PALPITATIONS: 0
FEVER: 0
JOINT SWELLING: 0
COUGH: 0
CONSTIPATION: 0
VOMITING: 0
DIAPHORESIS: 0
SORE THROAT: 0
HALLUCINATIONS: 0
CHEST TIGHTNESS: 0
DIARRHEA: 0
CHILLS: 0
ABDOMINAL PAIN: 1
DIZZINESS: 0
SHORTNESS OF BREATH: 0
NAUSEA: 0
DYSURIA: 1
HEMATURIA: 1
HEADACHES: 0
CONFUSION: 0

## 2024-10-08 ASSESSMENT — PAIN SCALES - GENERAL: PAINLEVEL_OUTOF10: 0 - NO PAIN

## 2024-10-08 NOTE — PROGRESS NOTES
10/08/24 1437   Discharge Planning   Living Arrangements Spouse/significant other   Support Systems Spouse/significant other   Assistance Needed Independent   Type of Residence Private residence   Who is requesting discharge planning? Provider   Home or Post Acute Services None   Expected Discharge Disposition Home   Does the patient need discharge transport arranged? No   Patient Choice   Patient / Family choosing to utilize agency / facility established prior to hospitalization No     Care transitions assessment completed via bedside with patient and spouse, Alonso. TCC introduced self and explained role. Demographics verified. Patient from home with spouse.  Independent PTA. Denies use of assistive devices. Denies SW needs at this time. Patient anticipates no skilled needs at discharge. Dispo pending hospital course. Patient's family to transport home at time of discharge. TCC to continue to follow for discharge planning needs.      PCP: Dr. Derrick Dodson Last seen: 7/2024  Pharmacy: [facility]   Insurance: [employer] Medicare, generic commercial secondary   Dispo: Home no needs, has gallegos and wants to see if can be removed before dc home     RONNIE COSTA RN TCC

## 2024-10-08 NOTE — PROGRESS NOTES
10/08/24 1044   Discharge Planning   Living Arrangements Spouse/significant other   Support Systems Spouse/significant other   Type of Residence Private residence     Patient admitted with a kidney stone and urinary retention. Lives with wife. Attempted to call patient with no answer.  Per urology documentation, patient will discharge home with a gallegos for 1 week. CT team to follow for discharge needs.

## 2024-10-08 NOTE — CONSULTS
Reason For Consult  Acute urinary retention, acute kidney injury, ureteral calculus    History Of Present Illness  Tom Velasquez is a 79 y.o. male presenting with past medical history of PAD, hypertension, and hyperlipidemia presented to [facility] emergency department with difficulty urinating.  While he is in the emergency department he complained of abdominal pain for the past 2 to 3 days.  He is felt that he was constipated he had a Sanderson catheter placed while in the emergency department and had over 600 cc of urine output.  He then had a CT scan of the abdomen and pelvis which showed right-sided mild hydronephrosis, Sanderson catheter in place, and a 3 x 6 mm recently passed kidney stone in the bladder.  He states that he had right sided lower abdominal pain that has completely resolved.     Past Medical History  He has a past medical history of Bilateral sensorineural hearing loss (08/23/2024), Dyslipidemia (04/11/2024), Gastroesophageal reflux disease with esophagitis without hemorrhage (04/11/2024), Idiopathic Parkinson's disease (Multi) (09/05/2022), Other specified health status, Overactive bladder (07/15/2024), Parkinson's disease (Multi) (05/16/2022), Primary hypertension (04/11/2024), Right inguinal hernia (10/21/2003), and Unspecified abdominal hernia without obstruction or gangrene.    Surgical History  He has a past surgical history that includes Hernia repair (Right).     Social History  He reports that he has never smoked. He has never been exposed to tobacco smoke. He has never used smokeless tobacco. He reports that he does not drink alcohol and does not use drugs.    Family History  Family History   Problem Relation Name Age of Onset    No Known Problems Father          Allergies  Sulfa (sulfonamide antibiotics)    Review of Systems  I reviewed the H&P and there is been no change      Physical Exam  Is alert and oriented  No acute distress  Nonlabored breathing  Abdomen soft nontender no  "organomegaly appreciated     Last Recorded Vitals  Blood pressure 132/73, pulse 95, temperature 37.3 °C (99.1 °F), temperature source Temporal, resp. rate 20, height 1.651 m (5' 5\"), weight 72.6 kg (160 lb 0.9 oz), SpO2 92%.    Relevant Results      .CT abdomen pelvis wo IV contrast    Result Date: 10/7/2024  STUDY: CT Abdomen and Pelvis without IV Contrast; 10/7/2024 at 9:05 AM. INDICATION: Acute kidney injury, urinary retention. COMPARISON: None available. ACCESSION NUMBER(S): [id number] ORDERING CLINICIAN: JJ FAULKNER TECHNIQUE: CT of the abdomen and pelvis was performed.  Contiguous axial images were obtained at 3 mm slice thickness through the abdomen and pelvis. Coronal and sagittal reconstructions at 3 mm slice thickness were performed. No intravenous contrast was administered.  Automated mA/kV exposure control was utilized and patient examination was performed in strict accordance with principles of ALARA. FINDINGS: Partially visualized chest showing the heart to be of normal size. No pericardial effusion.  Small focus of subsegmental atelectasis LEFT lung base. Abdomen: No acute abnormality of the stomach is identified. The liver of normal size and contour. No intrahepatic ductal dilatation is identified.  Gallbladder normal.  No acute abnormality of the pancreas.  Chronic calcification of the splenic capsule. Adrenal glands of normal appearance. Mild RIGHT hydronephrosis.  Mild RIGHT hydroureter.  There is an approximate 6 mm x 3 mm stone in the expected location of the RIGHT ureterovesicular junction. LEFT extrarenal pelvis.  No acute LEFT renal abnormality identified. No retroperitoneal adenopathy. No findings of abdominal aortic aneurysm. No bowel obstruction.  No free air.  No free fluid. There is a normal-appearing appendix. Pelvis: No pelvic free fluid.  No inflammatory change or findings of free air. No findings of pelvic adenopathy.  Prostate gland measuring approximately 6 cm x 6 cm x 7 cm " for an estimated volume of 126 cc. Sanderson catheter within the urinary bladder.  Urinary bladder is collapsed. Skeleton: No acute bony process is identified.    Mild RIGHT hydronephrosis and hydroureter.  There is an approximate 6 mm x 3 mm stone in the expected location of the RIGHT ureterovesicular junction.  Sanderson catheter within collapsed urinary bladder.  Correlate clinically.  No priors are available for comparison. Enlarged prostate. Small focus of subsegmental atelectasis LEFT lung base. Chronic calcification of the splenic capsule.  Question prior trauma. Signed by Jose Gu MD         .amantadine, 100 mg, oral, Daily  atorvastatin, 20 mg, oral, Daily  carbidopa-levodopa, 1.5 tablet, oral, TID  pantoprazole, 40 mg, oral, Daily  tamsulosin, 0.4 mg, oral, q12h MARISSA     ..  Results for orders placed or performed during the hospital encounter of 10/07/24 (from the past 24 hour(s))   POCT GLUCOSE   Result Value Ref Range    POCT Glucose 87 74 - 99 mg/dL   POCT GLUCOSE   Result Value Ref Range    POCT Glucose 99 74 - 99 mg/dL   POCT GLUCOSE   Result Value Ref Range    POCT Glucose 93 74 - 99 mg/dL   CBC   Result Value Ref Range    WBC 8.1 4.4 - 11.3 x10*3/uL    nRBC 0.0 0.0 - 0.0 /100 WBCs    RBC 4.34 (L) 4.50 - 5.90 x10*6/uL    Hemoglobin 13.3 (L) 13.5 - 17.5 g/dL    Hematocrit 40.1 (L) 41.0 - 52.0 %    MCV 92 80 - 100 fL    MCH 30.6 26.0 - 34.0 pg    MCHC 33.2 32.0 - 36.0 g/dL    RDW 12.8 11.5 - 14.5 %    Platelets 158 150 - 450 x10*3/uL   Basic metabolic panel   Result Value Ref Range    Glucose 96 74 - 99 mg/dL    Sodium 138 136 - 145 mmol/L    Potassium 4.4 3.5 - 5.3 mmol/L    Chloride 106 98 - 107 mmol/L    Bicarbonate 25 21 - 32 mmol/L    Anion Gap 11 10 - 20 mmol/L    Urea Nitrogen 26 (H) 6 - 23 mg/dL    Creatinine 1.61 (H) 0.50 - 1.30 mg/dL    eGFR 43 (L) >60 mL/min/1.73m*2    Calcium 8.6 8.6 - 10.3 mg/dL   POCT GLUCOSE   Result Value Ref Range    POCT Glucose 92 74 - 99 mg/dL      Assessment/Plan      79-year-old gentleman with urinary retention, NOÉ and right ureteral calculus  -Maintain the Sanderson catheter for at least 1 week before a trial of void this can be done as an outpatient.  - Will obtain a KUB to confirm that the kidney stone has been passed as it appears to be in the bladder on the CT scan.  He is also had good improvement with the NOÉ creatinine started at 2.16 and is now down to 1.6.  He denies any further pain or discomfort.  - He may have a regular diet at this time.    Thank you for allowing us to participate in his care    I spent 30 minutes in the professional and overall care of this patient.      Efren Hinds PA-C

## 2024-10-08 NOTE — CARE PLAN
The patient's goals for the shift include      The clinical goals for the shift include Safety and comfort

## 2024-10-08 NOTE — H&P
History Of Present Illness  Tom Velasquez is a 79 y.o. male with PMH of PD, HTN, and HLD presented to [facility] ED from home on 10/7/2024 with difficulty urinating.The pt had some abdominal pain on Saturday. He was having some issues with constipation and with urination. He thought it might be constipation so he used some OTC stool softeners. He had 2 BM yesterday but is still having difficulty urinating. It burns when he urinates and has a sensation of fullness. He denies any fever, chills, chest pain, SOB, CHAN, nausea, or vomiting. When he has been able to urinate, his urine was dark in color. He decided to come to the ER. ER evaluation revealed microscopic hematuria on UA, an NOÉ, and a partially obstructing kidney stone in his right kidney. Pt was also bladder scanned and found to have >600cc of urine. A Sanderson catheter was placed in ER. ER recommended the pt be brought into the hospital. He has been accepted on the service of Dr. Thomas with consultation to urology. Pt is very active and goes to [facility] regularly.    Past Medical History  Past Medical History:   Diagnosis Date    Bilateral sensorineural hearing loss 08/23/2024    Dyslipidemia 04/11/2024    Gastroesophageal reflux disease with esophagitis without hemorrhage 04/11/2024    Idiopathic Parkinson's disease (Multi) 09/05/2022    Other specified health status     No pertinent past medical history    Overactive bladder 07/15/2024    Parkinson's disease (Multi) 05/16/2022    Parkinsonism    Primary hypertension 04/11/2024    Right inguinal hernia 10/21/2003    Unspecified abdominal hernia without obstruction or gangrene     Hernia       Surgical History  Past Surgical History:   Procedure Laterality Date    HERNIA REPAIR Right         Social History  Social History     Tobacco Use    Smoking status: Never     Passive exposure: Never    Smokeless tobacco: Never   Vaping Use    Vaping status: Never Used   Substance Use Topics    Alcohol use: Never    Drug use:  Never        Family History  Family History   Problem Relation Name Age of Onset    No Known Problems Father          Allergies  Sulfa (sulfonamide antibiotics)    Review of Systems   Constitutional:  Negative for chills, diaphoresis and fever.   HENT:  Negative for congestion and sore throat.    Eyes:  Negative for visual disturbance.   Respiratory:  Negative for cough, chest tightness, shortness of breath and wheezing.    Cardiovascular:  Negative for chest pain, palpitations and leg swelling.   Gastrointestinal:  Positive for abdominal pain. Negative for constipation, diarrhea, nausea and vomiting.   Endocrine: Negative for polyuria.   Genitourinary:  Positive for dysuria and hematuria.   Musculoskeletal:  Negative for joint swelling.   Skin:  Negative for rash.   Allergic/Immunologic: Negative for immunocompromised state.   Neurological:  Negative for dizziness, syncope and headaches.   Psychiatric/Behavioral:  Negative for confusion and hallucinations.      Physical Exam  Constitutional:       Appearance: Normal appearance. He is normal weight.   HENT:      Head: Normocephalic and atraumatic.      Mouth/Throat:      Mouth: Mucous membranes are moist.   Eyes:      Conjunctiva/sclera: Conjunctivae normal.   Cardiovascular:      Rate and Rhythm: Normal rate and regular rhythm.      Heart sounds: Murmur heard.   Pulmonary:      Effort: No respiratory distress.      Breath sounds: No wheezing, rhonchi or rales.   Abdominal:      General: There is no distension.      Tenderness: There is no abdominal tenderness. There is no guarding.   Musculoskeletal:         General: No deformity.      Cervical back: No rigidity.   Skin:     General: Skin is warm.   Neurological:      General: No focal deficit present.      Mental Status: He is alert and oriented to person, place, and time.   Psychiatric:         Mood and Affect: Mood normal.       Last Recorded Vitals  Visit Vitals  /73 (BP Location: Right arm, Patient  "Position: Lying)   Pulse 95   Temp 37.3 °C (99.1 °F) (Temporal)   Resp 20   Ht 1.651 m (5' 5\")   Wt 72.6 kg (160 lb 0.9 oz)   SpO2 92%   BMI 26.63 kg/m²   Smoking Status Never   BSA 1.82 m²        Relevant Results  Results for orders placed or performed during the hospital encounter of 10/07/24 (from the past 24 hour(s))   POCT GLUCOSE   Result Value Ref Range    POCT Glucose 87 74 - 99 mg/dL   POCT GLUCOSE   Result Value Ref Range    POCT Glucose 99 74 - 99 mg/dL   POCT GLUCOSE   Result Value Ref Range    POCT Glucose 93 74 - 99 mg/dL   CBC   Result Value Ref Range    WBC 8.1 4.4 - 11.3 x10*3/uL    nRBC 0.0 0.0 - 0.0 /100 WBCs    RBC 4.34 (L) 4.50 - 5.90 x10*6/uL    Hemoglobin 13.3 (L) 13.5 - 17.5 g/dL    Hematocrit 40.1 (L) 41.0 - 52.0 %    MCV 92 80 - 100 fL    MCH 30.6 26.0 - 34.0 pg    MCHC 33.2 32.0 - 36.0 g/dL    RDW 12.8 11.5 - 14.5 %    Platelets 158 150 - 450 x10*3/uL   Basic metabolic panel   Result Value Ref Range    Glucose 96 74 - 99 mg/dL    Sodium 138 136 - 145 mmol/L    Potassium 4.4 3.5 - 5.3 mmol/L    Chloride 106 98 - 107 mmol/L    Bicarbonate 25 21 - 32 mmol/L    Anion Gap 11 10 - 20 mmol/L    Urea Nitrogen 26 (H) 6 - 23 mg/dL    Creatinine 1.61 (H) 0.50 - 1.30 mg/dL    eGFR 43 (L) >60 mL/min/1.73m*2    Calcium 8.6 8.6 - 10.3 mg/dL   POCT GLUCOSE   Result Value Ref Range    POCT Glucose 92 74 - 99 mg/dL        Imaging  CT abdomen pelvis wo IV contrast    Result Date: 10/7/2024  STUDY: CT Abdomen and Pelvis without IV Contrast; 10/7/2024 at 9:05 AM. INDICATION: Acute kidney injury, urinary retention. COMPARISON: None available. ACCESSION NUMBER(S): [id number] ORDERING CLINICIAN: JJ FAULKNER TECHNIQUE: CT of the abdomen and pelvis was performed.  Contiguous axial images were obtained at 3 mm slice thickness through the abdomen and pelvis. Coronal and sagittal reconstructions at 3 mm slice thickness were performed. No intravenous contrast was administered.  Automated mA/kV exposure control " was utilized and patient examination was performed in strict accordance with principles of ALARA. FINDINGS: Partially visualized chest showing the heart to be of normal size. No pericardial effusion.  Small focus of subsegmental atelectasis LEFT lung base. Abdomen: No acute abnormality of the stomach is identified. The liver of normal size and contour. No intrahepatic ductal dilatation is identified.  Gallbladder normal.  No acute abnormality of the pancreas.  Chronic calcification of the splenic capsule. Adrenal glands of normal appearance. Mild RIGHT hydronephrosis.  Mild RIGHT hydroureter.  There is an approximate 6 mm x 3 mm stone in the expected location of the RIGHT ureterovesicular junction. LEFT extrarenal pelvis.  No acute LEFT renal abnormality identified. No retroperitoneal adenopathy. No findings of abdominal aortic aneurysm. No bowel obstruction.  No free air.  No free fluid. There is a normal-appearing appendix. Pelvis: No pelvic free fluid.  No inflammatory change or findings of free air. No findings of pelvic adenopathy.  Prostate gland measuring approximately 6 cm x 6 cm x 7 cm for an estimated volume of 126 cc. Sanderson catheter within the urinary bladder.  Urinary bladder is collapsed. Skeleton: No acute bony process is identified.    Mild RIGHT hydronephrosis and hydroureter.  There is an approximate 6 mm x 3 mm stone in the expected location of the RIGHT ureterovesicular junction.  Sanderson catheter within collapsed urinary bladder.  Correlate clinically.  No priors are available for comparison. Enlarged prostate. Small focus of subsegmental atelectasis LEFT lung base. Chronic calcification of the splenic capsule.  Question prior trauma. Signed by Jose Gu MD       Home Medications  Prior to Admission medications    Medication Sig Start Date End Date Taking? Authorizing Provider   amantadine (Symmetrel) 100 mg capsule Take 1 capsule (100 mg) by mouth once daily.    Historical Provider, MD    atorvastatin (Lipitor) 20 mg tablet TAKE 1 TABLET BY MOUTH EVERY DAY 6/5/24   Derrick Dodson DO   carbidopa-levodopa (Sinemet)  mg tablet Take 1.5 tablets by mouth 3 times a day. 11/13/23   Historical Provider, MD   pantoprazole (ProtoNix) 40 mg EC tablet TAKE 1 TABLET (40 MG) BY MOUTH DAILY DO NOT CRUSH, CHEW, ORSPLIT 10/1/24   Derrick Dodson DO   tamsulosin (Flomax) 0.4 mg 24 hr capsule Take 1 capsule (0.4 mg) by mouth every 12 hours. 11/8/23   Historical Provider, MD   ergocalciferol (Vitamin D-2) 1.25 MG (51143 UT) capsule TAKE 1 CAPSULE (06281 UNITS) BY MOUTH ONE TIME PER WEEK 6/9/24 10/7/24  Derrick Dodson DO   pantoprazole (ProtoNix) 40 mg EC tablet Take 1 tablet (40 mg) by mouth once daily. Do not crush, chew, or split. 4/11/24 10/1/24  Derrick Dodson, DO       Medications  Scheduled medications  amantadine, 100 mg, oral, Daily  atorvastatin, 20 mg, oral, Daily  carbidopa-levodopa, 1.5 tablet, oral, TID  pantoprazole, 40 mg, oral, Daily  tamsulosin, 0.4 mg, oral, q12h MARISSA      Continuous medications  lactated Ringer's, 100 mL/hr, Last Rate: 100 mL/hr (10/08/24 0036)      PRN medications  acetaminophen, 650 mg, q4h PRN   Or  acetaminophen, 650 mg, q4h PRN   Or  acetaminophen, 650 mg, q4h PRN  melatonin, 3 mg, Nightly PRN  polyethylene glycol, 17 g, Daily PRN           Assessment/Plan   Assessment & Plan  Kidney stone on right side    Urinary retention      Nephrolithiasis of right kidney  Right hydroureteronephrosis  Acute urinary retention  NOÉ  Leukocytosis  -HDS  -Sanderson catheter placed for urinary retention>600cc  -Home Flomax 0.4mg BID continued  -Urology consult  -Continue IVF  -Monitor renal function and avoid nephrotoxic meds  -NPO until urology evaluation in case of procedure  -UA does not appear infected but showing microscopic hematuria  -Mild elevation of WBC at 12.1. Afebrile. Pt denies fever, chills, cough, N/V/D. May be reactive to kidney stone. Will get  procal.    PD  HTN  HLD  -No acute issues  -Continue home meds  -Does not appear to be on any home antihypertensives. Will monitor BP and tx per pt clinical course.  -Statin continued    I spent 40 minutes in the professional and overall care of this patient.     See additional orders for further plan of care.   Further evaluation and management per attending and consulting physicians.      Venancio Thomas DO  [facility] Staff  [id number]    10/8: no UTI on UA, likely stone causing pain and some retention, gallegos  with improved red, await urology input,

## 2024-10-08 NOTE — CARE PLAN
The patient's goals for the shift include  rest    The clinical goals for the shift include pt will remain safe and comftorabe throughout the remainder of the shift

## 2024-10-09 ENCOUNTER — PATIENT OUTREACH (OUTPATIENT)
Dept: PRIMARY CARE | Facility: CLINIC | Age: 79
End: 2024-10-09
Payer: COMMERCIAL

## 2024-10-09 NOTE — PROGRESS NOTES
Discharge Facility:[facility]  Discharge Diagnosis:Kidney stone on right side; Right hydroureteronephrosis  Acute urinary retention  NOÉ  Leukocytosis  Admission Date:10.7.24  Discharge Date: 10.8.24    PCP Appointment Date:10.16.24  Specialist Appointment Date:   Hospital Encounter and Summary Linked: Yes  See discharge assessment below for further details   Engagement  Call Start Time: 1401 (10/9/2024  2:01 PM)    Medications  Medications reviewed with patient/caregiver?: Yes (10/9/2024  2:01 PM)  Is the patient having any side effects they believe may be caused by any medication additions or changes?: No (10/9/2024  2:01 PM)  Does the patient have all medications ordered at discharge?: Yes (10/9/2024  2:01 PM)  Care Management Interventions: No intervention needed (10/9/2024  2:01 PM)  Is the patient taking all medications as directed (includes completed medication regime)?: Yes (10/9/2024  2:01 PM)  Care Management Interventions: Provided patient education (10/9/2024  2:01 PM)  Medication Comments: Verbalizes understanding of medication changes (10/9/2024  2:01 PM)    Appointments  Does the patient have a primary care provider?: Yes (10/9/2024  2:01 PM)  Care Management Interventions: Verified appointment date/time/provider (10/9/2024  2:01 PM)  Has the patient kept scheduled appointments due by today?: Yes (10/9/2024  2:01 PM)  Care Management Interventions: Advised patient to keep appointment; Educated on importance of keeping appointment (10/9/2024  2:01 PM)    Self Management  What is the home health agency?: n/a (10/9/2024  2:01 PM)  What Durable Medical Equipment (DME) was ordered?: n/a (10/9/2024  2:01 PM)    Patient Teaching  Does the patient have access to their discharge instructions?: Yes (10/9/2024  2:01 PM)  Care Management Interventions: Reviewed instructions with patient (10/9/2024  2:01 PM)  What is the patient's perception of their health status since discharge?: Improving (10/9/2024  2:01 PM)  Is  the patient/caregiver able to teach back the hierarchy of who to call/visit for symptoms/problems? PCP, Specialist, Home Health nurse, Urgent Care, ED, 911: Yes (10/9/2024  2:01 PM)  Patient/Caregiver Education Comments: Spoke with spouse. Was concerned that she was told to make a Urology appt in a week but sis not know who to make it with. Patient already had an urologist and has appt in August so suggested that she call that oofice to make appt. Needs a follow up to assess if gallegos can be removed, Gallegos is draining. Patient not in acute stone pain at this time. Spouse is encouraging fluids. (10/9/2024  2:01 PM)

## 2024-10-09 NOTE — DISCHARGE SUMMARY
Discharge Diagnosis  Kidney stone on right side    Issues Requiring Follow-Up  Kidney stoe    Test Results Pending At Discharge  Pending Labs       No current pending labs.            Hospital Course   Nephrolithiasis of right kidney  Right hydroureteronephrosis  Acute urinary retention  NOÉ  Leukocytosis  -HDS  -Gallegos catheter placed for urinary retention>600cc  -Home Flomax 0.4mg BID continued  -Urology consult  -Continue IVF  -Monitor renal function and avoid nephrotoxic meds  -NPO until urology evaluation in case of procedure  -UA does not appear infected but showing microscopic hematuria  -Mild elevation of WBC at 12.1. Afebrile. Pt denies fever, chills, cough, N/V/D. May be reactive to kidney stone. Will get procal.     PD  HTN  HLD  -No acute issues  -Continue home meds  -Does not appear to be on any home antihypertensives. Will monitor BP and tx per pt clinical course.  -Statin continued     I spent 40 minutes in the professional and overall care of this patient.      See additional orders for further plan of care.   Further evaluation and management per attending and consulting physicians.       Venancio Thomas DO  [facility] Staff  [id number]     10/8: no UTI on UA, likely stone causing pain and some retention, gallegos  with improved noé, await urology input      Pertinent Physical Exam At Time of Discharge  Physical Exam  Constitutional:       Appearance: Normal appearance.   HENT:      Head: Normocephalic and atraumatic.      Right Ear: Tympanic membrane and ear canal normal.      Left Ear: Tympanic membrane and ear canal normal.      Mouth/Throat:      Mouth: Mucous membranes are moist.      Pharynx: Oropharynx is clear.   Eyes:      Extraocular Movements: Extraocular movements intact.      Conjunctiva/sclera: Conjunctivae normal.      Pupils: Pupils are equal, round, and reactive to light.   Cardiovascular:      Rate and Rhythm: Normal rate and regular rhythm.      Pulses: Normal pulses.      Heart sounds: Normal  heart sounds.   Pulmonary:      Effort: Pulmonary effort is normal.      Breath sounds: Normal breath sounds.   Abdominal:      General: Abdomen is flat. Bowel sounds are normal.      Palpations: Abdomen is soft.   Musculoskeletal:         General: Normal range of motion.      Cervical back: Normal range of motion and neck supple.   Skin:     General: Skin is warm and dry.      Capillary Refill: Capillary refill takes 2 to 3 seconds.   Neurological:      General: No focal deficit present.      Mental Status: He is alert and oriented to person, place, and time. Mental status is at baseline.   Psychiatric:         Mood and Affect: Mood normal.         Behavior: Behavior normal.         Thought Content: Thought content normal.         Judgment: Judgment normal.         Home Medications     Medication List      CONTINUE taking these medications     amantadine 100 mg capsule; Commonly known as: Symmetrel   atorvastatin 20 mg tablet; Commonly known as: Lipitor; TAKE 1 TABLET BY   MOUTH EVERY DAY   carbidopa-levodopa  mg tablet; Commonly known as: Sinemet   pantoprazole 40 mg EC tablet; Commonly known as: ProtoNix; TAKE 1 TABLET   (40 MG) BY MOUTH DAILY DO NOT CRUSH, CHEW, ORSPLIT   tamsulosin 0.4 mg 24 hr capsule; Commonly known as: Flomax       Outpatient Follow-Up  Future Appointments   Date Time Provider Department Center   10/16/2024  1:00 PM DO Yoni [id number] [place]   1/15/2025  9:30 AM DO Marielos Whitten [place]   8/27/2025 10:40 AM Chente Whaley MD [id number] [place]       Venancio Thomas DO

## 2024-10-16 ENCOUNTER — APPOINTMENT (OUTPATIENT)
Dept: PRIMARY CARE | Facility: CLINIC | Age: 79
End: 2024-10-16
Payer: COMMERCIAL

## 2024-10-16 VITALS
OXYGEN SATURATION: 95 % | TEMPERATURE: 98 F | HEART RATE: 71 BPM | WEIGHT: 178 LBS | DIASTOLIC BLOOD PRESSURE: 70 MMHG | SYSTOLIC BLOOD PRESSURE: 114 MMHG | BODY MASS INDEX: 29.66 KG/M2 | RESPIRATION RATE: 16 BRPM | HEIGHT: 65 IN

## 2024-10-16 DIAGNOSIS — R33.8 BENIGN PROSTATIC HYPERPLASIA WITH URINARY RETENTION: ICD-10-CM

## 2024-10-16 DIAGNOSIS — N20.0 KIDNEY STONE ON RIGHT SIDE: ICD-10-CM

## 2024-10-16 DIAGNOSIS — N40.1 BENIGN PROSTATIC HYPERPLASIA WITH URINARY RETENTION: ICD-10-CM

## 2024-10-16 DIAGNOSIS — G20.A1 IDIOPATHIC PARKINSON'S DISEASE (MULTI): ICD-10-CM

## 2024-10-16 DIAGNOSIS — R33.9 URINARY RETENTION: ICD-10-CM

## 2024-10-16 DIAGNOSIS — N32.81 OVERACTIVE BLADDER: ICD-10-CM

## 2024-10-16 DIAGNOSIS — Z09 HOSPITAL DISCHARGE FOLLOW-UP: Primary | ICD-10-CM

## 2024-10-16 DIAGNOSIS — N20.1 RIGHT URETERAL CALCULUS: ICD-10-CM

## 2024-10-16 PROCEDURE — 1160F RVW MEDS BY RX/DR IN RCRD: CPT | Performed by: FAMILY MEDICINE

## 2024-10-16 PROCEDURE — 1036F TOBACCO NON-USER: CPT | Performed by: FAMILY MEDICINE

## 2024-10-16 PROCEDURE — 99495 TRANSJ CARE MGMT MOD F2F 14D: CPT | Performed by: FAMILY MEDICINE

## 2024-10-16 PROCEDURE — 3074F SYST BP LT 130 MM HG: CPT | Performed by: FAMILY MEDICINE

## 2024-10-16 PROCEDURE — 1159F MED LIST DOCD IN RCRD: CPT | Performed by: FAMILY MEDICINE

## 2024-10-16 PROCEDURE — 1126F AMNT PAIN NOTED NONE PRSNT: CPT | Performed by: FAMILY MEDICINE

## 2024-10-16 PROCEDURE — 1111F DSCHRG MED/CURRENT MED MERGE: CPT | Performed by: FAMILY MEDICINE

## 2024-10-16 PROCEDURE — 3078F DIAST BP <80 MM HG: CPT | Performed by: FAMILY MEDICINE

## 2024-10-16 RX ORDER — TAMSULOSIN HYDROCHLORIDE 0.4 MG/1
0.4 CAPSULE ORAL
Qty: 60 CAPSULE | Refills: 11 | Status: SHIPPED | OUTPATIENT
Start: 2024-10-16

## 2024-10-16 ASSESSMENT — PATIENT HEALTH QUESTIONNAIRE - PHQ9
1. LITTLE INTEREST OR PLEASURE IN DOING THINGS: NOT AT ALL
SUM OF ALL RESPONSES TO PHQ9 QUESTIONS 1 AND 2: 0
2. FEELING DOWN, DEPRESSED OR HOPELESS: NOT AT ALL

## 2024-10-16 ASSESSMENT — PAIN SCALES - GENERAL: PAINLEVEL_OUTOF10: 0-NO PAIN

## 2024-10-16 NOTE — PROGRESS NOTES
Subjective   Tom Velasquez is a 79 y.o. male who presents for No chief complaint on file..    HPI  : Patient in for hospital follow-up visit.  He was hospitalized from October 7, through October 8 for urinary retention and right ureteral calculus.  He apparently had severe pain at the time of admission and also urinary retention with abdominal bloating.  He is in today for hospital follow-up visit and still has a Sanderson catheter in.  He is accompanied with his wife since the patient does not speak very good English.  Blood pressure and other vitals are stable.  He has no further right flank pain but does want the catheter removed.  He is scheduled to see urology tomorrow for removal of the Sanderson catheter.  He does have a history of BPH and Parkinson's disease.    Objective  : ROS :10 systems were reviewed and the information is included in the HPI and no additional review of systems is indicated.    Physical Exam  Vitals and nursing note reviewed.   Constitutional:       Appearance: Normal appearance. He is normal weight.      Comments: Patient is alert and oriented x3.   No acute distress   HENT:      Head: Normocephalic.      Right Ear: Tympanic membrane and external ear normal.      Left Ear: Tympanic membrane and external ear normal.      Ears:      Comments: Ears are patent bilaterally and TMs are clear.     Nose: Nose normal.      Mouth/Throat:      Mouth: Mucous membranes are moist.      Pharynx: Oropharynx is clear.      Comments: Mouth is moist, tongue is midline.  No posterior pharyngeal erythema.  Eyes:      Extraocular Movements: Extraocular movements intact.      Conjunctiva/sclera: Conjunctivae normal.      Pupils: Pupils are equal, round, and reactive to light.      Comments: No visual disturbance, does have his eyes examined once a year.   Neck:      Comments: No carotid bruits, no thyromegaly, no cervical adenopathy.  Occasional neck spasm and restriction of motion secondary to stress and  tension.  Cardiovascular:      Rate and Rhythm: Normal rate and regular rhythm.      Pulses: Normal pulses.      Heart sounds: Normal heart sounds.      Comments: Patient denies chest pain and no palpitations.  Heart rhythm is stable S1 and S2 are noted, no ectopics.  Pulmonary:      Breath sounds: Rhonchi present.      Comments: Shallow depth of inspiration.  Lungs with few bilateral rhonchi to auscultation.  Patient denies any coughing or wheezing.  Abdominal:      General: Abdomen is flat. Bowel sounds are normal.      Palpations: Abdomen is soft.      Comments: Abdomen is soft and nontender, no hepatosplenomegaly.  No flank tenderness.  No suprapubic pain.  Positive bowel sounds x4.  No abdominal guarding and no rebound tenderness.   Genitourinary:     Comments: Patient was hospitalized with urinary retention, right ureteral calculus and severe pain.  He still has a Sanderson catheter in due to BPH and will be seeing urology tomorrow for removal of the catheter.  He is no longer having any pain and he possibly did pass the stone.  He also had some right hydronephrosis.  Musculoskeletal:         General: Tenderness present. Normal range of motion.      Cervical back: Normal range of motion.      Comments: Age-related arthritis in the joints.  Mild restriction of motion cervical and lumbar spines due to arthritis, and muscle spasm.   Skin:     General: Skin is warm.      Comments: There is no bruising, no erythema, no skin lesions noted, no rashes.   Neurological:      General: No focal deficit present.      Mental Status: He is alert and oriented to person, place, and time. Mental status is at baseline.      Sensory: Sensory deficit present.      Coordination: Coordination abnormal.      Comments: Patient does have Parkinson's disease and follows with neurology.  He is on Sinemet and does have some tremors.  Coordination and gait are slightly unstable but he does not need a walker.  For the age of 79 he does get  around very well.   Psychiatric:         Mood and Affect: Mood normal.         Behavior: Behavior normal.         Thought Content: Thought content normal.         Judgment: Judgment normal.      Comments: Patient has flat  mood and affect.  Thought content and judgment are stable.  No signs of vascular dementia.  Behavior is normal.  Does not speak very good English.  Speaks Dutch.     PLAN : Patient is an 79-year-old male who is in today for hospital follow-up after acute urinary retention and right ureteral calculus with right hydronephrosis.  He did have severe abdominal bloating and right flank pain and was admitted through the emergency room for further evaluation.  Patient did have a CT scan of the abdomen and a flatplate x-ray of the abdomen.  He was much better after having the Sanderosn catheter placed to relieve the urinary retention.  He still has a Sanderson catheter in place and is scheduled to see urology tomorrow.  Blood pressure and vitals are stable and the abdomen is soft and nontender.  He will continue to take the Flomax twice daily and follow-up as needed after removal of the Sanderson catheter tomorrow.  His wife was with him since he does not speak much English and she did understand the next procedure.  He does have a long history of BPH and hopefully he will not need surgery to relieve the enlarged prostate.    Problem List Items Addressed This Visit    None           Derrick Dodson, DO

## 2024-10-17 ENCOUNTER — APPOINTMENT (OUTPATIENT)
Dept: UROLOGY | Facility: CLINIC | Age: 79
End: 2024-10-17
Payer: COMMERCIAL

## 2024-10-17 VITALS
BODY MASS INDEX: 29.32 KG/M2 | SYSTOLIC BLOOD PRESSURE: 137 MMHG | HEART RATE: 89 BPM | HEIGHT: 65 IN | DIASTOLIC BLOOD PRESSURE: 75 MMHG | WEIGHT: 176 LBS

## 2024-10-17 DIAGNOSIS — N20.1 URETERAL CALCULUS: Primary | ICD-10-CM

## 2024-10-17 PROCEDURE — 3075F SYST BP GE 130 - 139MM HG: CPT | Performed by: UROLOGY

## 2024-10-17 PROCEDURE — 1159F MED LIST DOCD IN RCRD: CPT | Performed by: UROLOGY

## 2024-10-17 PROCEDURE — 99213 OFFICE O/P EST LOW 20 MIN: CPT | Performed by: UROLOGY

## 2024-10-17 PROCEDURE — 1160F RVW MEDS BY RX/DR IN RCRD: CPT | Performed by: UROLOGY

## 2024-10-17 PROCEDURE — 3078F DIAST BP <80 MM HG: CPT | Performed by: UROLOGY

## 2024-10-17 PROCEDURE — 1111F DSCHRG MED/CURRENT MED MERGE: CPT | Performed by: UROLOGY

## 2024-10-17 RX ORDER — KETOROLAC TROMETHAMINE 10 MG/1
10 TABLET, FILM COATED ORAL EVERY 6 HOURS PRN
Qty: 10 TABLET | Refills: 0 | Status: SHIPPED | OUTPATIENT
Start: 2024-10-17

## 2024-10-17 NOTE — PROGRESS NOTES
Subjective   Patient ID: Tom Velasquez is a 79 y.o. male who presents for No chief complaint on file..  HPI  PT PRESENTS FOR EVALUATION OF A  6 x 3 MM STONE AT THE RIGHT UVJ.  Currently the pt feels well.  NO PAIN OR DISCOMFORT.  .  PT WAS SEEN IN THE ER AT [facility] AND HAD A WALKER PLACED BECAUSE OF URINARY RETENTION.  PT KEPT OVER NIGHT FOR PAIN CONTROL. HE NOW PRESENTS TO HAVE THE CATHETER REMOVED.       10-7-24  CAT SCAN OF THE ABD AND PELVIS:  IMPRESSION:  Mild RIGHT hydronephrosis and hydroureter.  There is an approximate 6  mm x 3 mm stone in the expected location of the RIGHT ureterovesicular  junction.  Walker catheter within collapsed urinary bladder.  Correlate  clinically.  No priors are available for comparison.  Enlarged prostate.  Small focus of subsegmental atelectasis LEFT lung base.  Chronic calcification of the splenic capsule.  Question prior trauma.  Signed by Jose Gu MD     Assessment/Plan   A:  3 X 6 MM RIGHT UVJ STONE  REMOTE URINARY RETENTION    P:  WALKER REMOVED TODAY  F/U  NEXT WEDS WITH A KUB  TORADOL 10 MG EVERY 6 HRS PRN PAIN  CONTINUE:  FLOMAX 0.4 MG BID   KRIS ALEJANDRA MD   10/17/24 9:03 AM

## 2024-10-17 NOTE — LETTER
October 20, 2024     Derrick Dodson DO  [address] [facility] [id number]    Patient: Tom Velasquez   YOB: 1945   Date of Visit: 10/17/2024       Dear Dr. Derrick Dodson DO:    Thank you for referring Tom Velasquez to me for evaluation. Below are my notes for this consultation.  If you have questions, please do not hesitate to call me. I look forward to following your patient along with you.       Sincerely,     Kris Whaley MD      CC: No Recipients  ______________________________________________________________________________________    Subjective  Patient ID: Tom Velasquez is a 79 y.o. male who presents for No chief complaint on file..  HPI  PT PRESENTS FOR EVALUATION OF A  6 x 3 MM STONE AT THE RIGHT UVJ.  Currently the pt feels well.  NO PAIN OR DISCOMFORT.  .  PT WAS SEEN IN THE ER AT [facility] AND HAD A WALKER PLACED BECAUSE OF URINARY RETENTION.  PT KEPT OVER NIGHT FOR PAIN CONTROL. HE NOW PRESENTS TO HAVE THE CATHETER REMOVED.       10-7-24  CAT SCAN OF THE ABD AND PELVIS:  IMPRESSION:  Mild RIGHT hydronephrosis and hydroureter.  There is an approximate 6  mm x 3 mm stone in the expected location of the RIGHT ureterovesicular  junction.  Walker catheter within collapsed urinary bladder.  Correlate  clinically.  No priors are available for comparison.  Enlarged prostate.  Small focus of subsegmental atelectasis LEFT lung base.  Chronic calcification of the splenic capsule.  Question prior trauma.  Signed by Jose Gu MD     Assessment/Plan   A:  3 X 6 MM RIGHT UVJ STONE  REMOTE URINARY RETENTION    P:  WALKER REMOVED TODAY  F/U  NEXT WEDS WITH A KUB  TORADOL 10 MG EVERY 6 HRS PRN PAIN  CONTINUE:  FLOMAX 0.4 MG BID   KRIS WHALEY MD   10/17/24 9:03 AM

## 2024-10-18 ENCOUNTER — HOSPITAL ENCOUNTER (EMERGENCY)
Facility: HOSPITAL | Age: 79
Discharge: HOME | End: 2024-10-18
Payer: COMMERCIAL

## 2024-10-18 ENCOUNTER — APPOINTMENT (OUTPATIENT)
Dept: RADIOLOGY | Facility: HOSPITAL | Age: 79
End: 2024-10-18
Payer: COMMERCIAL

## 2024-10-18 VITALS
RESPIRATION RATE: 18 BRPM | HEART RATE: 96 BPM | DIASTOLIC BLOOD PRESSURE: 96 MMHG | WEIGHT: 176 LBS | TEMPERATURE: 97.2 F | OXYGEN SATURATION: 95 % | BODY MASS INDEX: 29.32 KG/M2 | HEIGHT: 65 IN | SYSTOLIC BLOOD PRESSURE: 189 MMHG

## 2024-10-18 DIAGNOSIS — R33.9 URINARY RETENTION: Primary | ICD-10-CM

## 2024-10-18 LAB
APPEARANCE UR: CLEAR
BILIRUB UR STRIP.AUTO-MCNC: NEGATIVE MG/DL
COLOR UR: ABNORMAL
GLUCOSE UR STRIP.AUTO-MCNC: NORMAL MG/DL
KETONES UR STRIP.AUTO-MCNC: NEGATIVE MG/DL
LEUKOCYTE ESTERASE UR QL STRIP.AUTO: NEGATIVE
MUCOUS THREADS #/AREA URNS AUTO: ABNORMAL /LPF
NITRITE UR QL STRIP.AUTO: NEGATIVE
PH UR STRIP.AUTO: 5.5 [PH]
PROT UR STRIP.AUTO-MCNC: NEGATIVE MG/DL
RBC # UR STRIP.AUTO: ABNORMAL /UL
RBC #/AREA URNS AUTO: >20 /HPF
SP GR UR STRIP.AUTO: 1.01
UROBILINOGEN UR STRIP.AUTO-MCNC: NORMAL MG/DL
WBC #/AREA URNS AUTO: ABNORMAL /HPF

## 2024-10-18 PROCEDURE — 99283 EMERGENCY DEPT VISIT LOW MDM: CPT

## 2024-10-18 PROCEDURE — 81001 URINALYSIS AUTO W/SCOPE: CPT | Performed by: PHYSICIAN ASSISTANT

## 2024-10-18 PROCEDURE — 74018 RADEX ABDOMEN 1 VIEW: CPT | Performed by: RADIOLOGY

## 2024-10-18 PROCEDURE — 51702 INSERT TEMP BLADDER CATH: CPT

## 2024-10-18 PROCEDURE — 74018 RADEX ABDOMEN 1 VIEW: CPT

## 2024-10-18 ASSESSMENT — PAIN DESCRIPTION - ORIENTATION: ORIENTATION: LOWER

## 2024-10-18 ASSESSMENT — COLUMBIA-SUICIDE SEVERITY RATING SCALE - C-SSRS
1. IN THE PAST MONTH, HAVE YOU WISHED YOU WERE DEAD OR WISHED YOU COULD GO TO SLEEP AND NOT WAKE UP?: NO
6. HAVE YOU EVER DONE ANYTHING, STARTED TO DO ANYTHING, OR PREPARED TO DO ANYTHING TO END YOUR LIFE?: NO
2. HAVE YOU ACTUALLY HAD ANY THOUGHTS OF KILLING YOURSELF?: NO

## 2024-10-18 ASSESSMENT — LIFESTYLE VARIABLES
EVER HAD A DRINK FIRST THING IN THE MORNING TO STEADY YOUR NERVES TO GET RID OF A HANGOVER: NO
HAVE PEOPLE ANNOYED YOU BY CRITICIZING YOUR DRINKING: NO
TOTAL SCORE: 0
HAVE YOU EVER FELT YOU SHOULD CUT DOWN ON YOUR DRINKING: NO
EVER FELT BAD OR GUILTY ABOUT YOUR DRINKING: NO

## 2024-10-18 ASSESSMENT — PAIN SCALES - GENERAL: PAINLEVEL_OUTOF10: 5 - MODERATE PAIN

## 2024-10-18 ASSESSMENT — PAIN DESCRIPTION - DESCRIPTORS: DESCRIPTORS: ACHING

## 2024-10-18 ASSESSMENT — PAIN DESCRIPTION - LOCATION: LOCATION: ABDOMEN

## 2024-10-18 ASSESSMENT — PAIN - FUNCTIONAL ASSESSMENT: PAIN_FUNCTIONAL_ASSESSMENT: 0-10

## 2024-10-18 ASSESSMENT — PAIN DESCRIPTION - FREQUENCY: FREQUENCY: CONSTANT/CONTINUOUS

## 2024-10-18 NOTE — ED PROVIDER NOTES
HPI   Chief Complaint   Patient presents with    Urinary Retention     Pt had gallegos catheter removed yesterday at 11a related to kidney stone/retention. Urinated 3x after gallegos removed but has been unable to urinate since 8p. Complaint of lower abd  pain and bloating. Pt A/Ox4 from home       Patient presents complaining of urinary retention.  The patient is currently being followed by urology.  Patient was seen in the office earlier this morning and had his Gallegos catheter removed.  The patient states that he has not been able to urinate since.  No reports of fevers.  No reports of hematuria.  Patient does report a known history of kidney stones.              Patient History   Past Medical History:   Diagnosis Date    Bilateral sensorineural hearing loss 08/23/2024    Dyslipidemia 04/11/2024    Gastroesophageal reflux disease with esophagitis without hemorrhage 04/11/2024    Idiopathic Parkinson's disease (Multi) 09/05/2022    Other specified health status     No pertinent past medical history    Overactive bladder 07/15/2024    Parkinson's disease (Multi) 05/16/2022    Parkinsonism    Primary hypertension 04/11/2024    Right inguinal hernia 10/21/2003    Unspecified abdominal hernia without obstruction or gangrene     Hernia     Past Surgical History:   Procedure Laterality Date    HERNIA REPAIR Right      Family History   Problem Relation Name Age of Onset    No Known Problems Father       Social History     Tobacco Use    Smoking status: Never     Passive exposure: Never    Smokeless tobacco: Never   Vaping Use    Vaping status: Never Used   Substance Use Topics    Alcohol use: Never    Drug use: Never       Physical Exam   ED Triage Vitals [10/18/24 0120]   Temperature Heart Rate Respirations BP   36.2 °C (97.2 °F) 96 18 (!) 189/96      Pulse Ox Temp Source Heart Rate Source Patient Position   95 % Temporal Monitor --      BP Location FiO2 (%)     -- --       Physical Exam  Vitals and nursing note reviewed.    Constitutional:       General: He is not in acute distress.     Appearance: Normal appearance. He is normal weight. He is not ill-appearing, toxic-appearing or diaphoretic.   Cardiovascular:      Rate and Rhythm: Normal rate and regular rhythm.      Pulses: Normal pulses.   Pulmonary:      Effort: Pulmonary effort is normal. No respiratory distress.      Breath sounds: Normal breath sounds.   Abdominal:      General: Abdomen is flat. There is no distension.      Palpations: Abdomen is soft.      Tenderness: There is no abdominal tenderness. There is no right CVA tenderness, left CVA tenderness, guarding or rebound.   Skin:     General: Skin is warm and dry.      Capillary Refill: Capillary refill takes less than 2 seconds.   Neurological:      General: No focal deficit present.      Mental Status: He is alert and oriented to person, place, and time.   Psychiatric:         Mood and Affect: Mood normal.           ED Course & MDM   Diagnoses as of 10/18/24 0250   Urinary retention                 No data recorded     Major Coma Scale Score: 15 (10/18/24 0151 : Jose Mena RN)                           Medical Decision Making  Patient found to be afebrile on arrival.  He was found to have over 500 cc of urine present on bladder scan.  Sanderson catheter was placed with resolution of the patient's discomfort.  Urinalysis revealed no evidence of acute infection.  Family requested KUB  stating that they are due to follow-up on Tuesday.  This was ordered as requested.        Procedure  Procedures     iMck Zhu PA-C  10/18/24 0250

## 2024-10-22 NOTE — PROGRESS NOTES
Subjective   Patient ID: Tom Velasquez is a 79 y.o. male who presents for No chief complaint on file..  HPI  PT NOW PRESENTS FOR A F/U ON HIS 6 MM RIGHT UVJ STONE. HE HAD A WALKER REMOVED ON 10-17-24-- REINSERTED ON 10-18-24  Patient feels well denying any significant pain and or discomfort since the Walker was reinserted.  He denies any nausea, vomiting fever or teeth chattering chills at this time    Objective   10-18-24'  KUB:  IMPRESSION:  Poorly defined radiopaque density measuring approximately 5 mm  overlying the pelvis just to the right of midline possibly  corresponding to previously seen UVJ stone 10/07/2024. Additional  calcifications along the left aspect of the pelvis are most  compatible with phleboliths. Nonobstructive bowel gas pattern    Assessment/Plan   A:  3 X 6 MM RIGHT UVJ STONE  Persistent urinary retention from a very large prostate  Pathophysiology of the above discussed in detail with the patient and his wife  All questions were answered  P:  I will refer the patient to Dr. Susan mendoza for further evaluation and therapy with regards to the distal right ureteral calculus and his persistent urinary retention from a very large prostate  The wife and patient are agreeable to this plan of therapy    Chente Whaley MD 10/22/24 4:29 PM

## 2024-10-23 ENCOUNTER — OFFICE VISIT (OUTPATIENT)
Dept: UROLOGY | Facility: CLINIC | Age: 79
End: 2024-10-23
Payer: COMMERCIAL

## 2024-10-23 ENCOUNTER — PATIENT OUTREACH (OUTPATIENT)
Dept: PRIMARY CARE | Facility: CLINIC | Age: 79
End: 2024-10-23
Payer: MEDICARE

## 2024-10-23 VITALS
HEART RATE: 72 BPM | SYSTOLIC BLOOD PRESSURE: 104 MMHG | TEMPERATURE: 98.3 F | RESPIRATION RATE: 16 BRPM | DIASTOLIC BLOOD PRESSURE: 63 MMHG | HEIGHT: 65 IN | WEIGHT: 176 LBS | BODY MASS INDEX: 29.32 KG/M2

## 2024-10-23 DIAGNOSIS — R33.9 URINARY RETENTION: Primary | ICD-10-CM

## 2024-10-23 DIAGNOSIS — N20.1 URETERAL CALCULUS: ICD-10-CM

## 2024-10-23 DIAGNOSIS — N40.1 BENIGN PROSTATIC HYPERPLASIA WITH URINARY RETENTION: ICD-10-CM

## 2024-10-23 DIAGNOSIS — R33.8 BENIGN PROSTATIC HYPERPLASIA WITH URINARY RETENTION: ICD-10-CM

## 2024-10-23 PROCEDURE — 1126F AMNT PAIN NOTED NONE PRSNT: CPT | Performed by: UROLOGY

## 2024-10-23 PROCEDURE — 1160F RVW MEDS BY RX/DR IN RCRD: CPT | Performed by: UROLOGY

## 2024-10-23 PROCEDURE — 3078F DIAST BP <80 MM HG: CPT | Performed by: UROLOGY

## 2024-10-23 PROCEDURE — 1159F MED LIST DOCD IN RCRD: CPT | Performed by: UROLOGY

## 2024-10-23 PROCEDURE — 3074F SYST BP LT 130 MM HG: CPT | Performed by: UROLOGY

## 2024-10-23 PROCEDURE — 99212 OFFICE O/P EST SF 10 MIN: CPT | Performed by: UROLOGY

## 2024-10-23 PROCEDURE — 1111F DSCHRG MED/CURRENT MED MERGE: CPT | Performed by: UROLOGY

## 2024-10-23 ASSESSMENT — ENCOUNTER SYMPTOMS
LOSS OF SENSATION IN FEET: 0
DEPRESSION: 0
OCCASIONAL FEELINGS OF UNSTEADINESS: 0

## 2024-10-23 ASSESSMENT — PAIN SCALES - GENERAL: PAINLEVEL_OUTOF10: 0-NO PAIN

## 2024-10-23 NOTE — LETTER
October 26, 2024     Derrick Dodson DO  [address] [facility] [address]    Patient: Tom Velasquez   YOB: 1945   Date of Visit: 10/23/2024       Dear Dr. Derrick Dodson DO:    Thank you for referring Tom Velasquez to me for evaluation. Below are my notes for this consultation.  If you have questions, please do not hesitate to call me. I look forward to following your patient along with you.       Sincerely,     Chente Whaley MD      CC: No Recipients  ______________________________________________________________________________________    Subjective  Patient ID: Tom Velasquez is a 79 y.o. male who presents for No chief complaint on file..  HPI  PT NOW PRESENTS FOR A F/U ON HIS 6 MM RIGHT UVJ STONE. HE HAD A WALKER REMOVED ON 10-17-24-- REINSERTED ON 10-18-24  Patient feels well denying any significant pain and or discomfort since the Walker was reinserted.  He denies any nausea, vomiting fever or teeth chattering chills at this time    Objective  10-18-24'  KUB:  IMPRESSION:  Poorly defined radiopaque density measuring approximately 5 mm  overlying the pelvis just to the right of midline possibly  corresponding to previously seen UVJ stone 10/07/2024. Additional  calcifications along the left aspect of the pelvis are most  compatible with phleboliths. Nonobstructive bowel gas pattern    Assessment/Plan  A:  3 X 6 MM RIGHT UVJ STONE  Persistent urinary retention from a very large prostate  Pathophysiology of the above discussed in detail with the patient and his wife  All questions were answered  P:  I will refer the patient to Dr. Susan mendoza for further evaluation and therapy with regards to the distal right ureteral calculus and his persistent urinary retention from a very large prostate  The wife and patient are agreeable to this plan of therapy    Chente Whaley MD 10/22/24 4:29 PM

## 2024-10-23 NOTE — PROGRESS NOTES
Call after recent hospital discharge.   At time of outreach call the patient feels as if their condition has improved since discharge.  still has catheter

## 2024-10-23 NOTE — LETTER
October 26, 2024     Jose Cooper MD  [address] [facility] [address], [address] [place] [id number]    Patient: Tom Velasquez   YOB: 1945   Date of Visit: 10/23/2024       Dear Dr. Jose Cooper MD:    Thank you for referring Tom Velasquez to me for evaluation. Below are my notes for this consultation.  If you have questions, please do not hesitate to call me. I look forward to following your patient along with you.       Sincerely,     Chente Whaley MD      CC: No Recipients  ______________________________________________________________________________________    Subjective  Patient ID: Tom Velasquez is a 79 y.o. male who presents for No chief complaint on file..  HPI  PT NOW PRESENTS FOR A F/U ON HIS 6 MM RIGHT UVJ STONE. HE HAD A WALKER REMOVED ON 10-17-24-- REINSERTED ON 10-18-24  Patient feels well denying any significant pain and or discomfort since the Walker was reinserted.  He denies any nausea, vomiting fever or teeth chattering chills at this time    Objective  10-18-24'  KUB:  IMPRESSION:  Poorly defined radiopaque density measuring approximately 5 mm  overlying the pelvis just to the right of midline possibly  corresponding to previously seen UVJ stone 10/07/2024. Additional  calcifications along the left aspect of the pelvis are most  compatible with phleboliths. Nonobstructive bowel gas pattern    Assessment/Plan  A:  3 X 6 MM RIGHT UVJ STONE  Persistent urinary retention from a very large prostate  Pathophysiology of the above discussed in detail with the patient and his wife  All questions were answered  P:  I will refer the patient to Dr. Susan mendoza for further evaluation and therapy with regards to the distal right ureteral calculus and his persistent urinary retention from a very large prostate  The wife and patient are agreeable to this plan of therapy    Chente Whaley MD 10/22/24 4:29 PM

## 2024-10-24 ENCOUNTER — OFFICE VISIT (OUTPATIENT)
Facility: CLINIC | Age: 79
End: 2024-10-24
Payer: MEDICARE

## 2024-10-24 VITALS
DIASTOLIC BLOOD PRESSURE: 74 MMHG | BODY MASS INDEX: 29.26 KG/M2 | SYSTOLIC BLOOD PRESSURE: 126 MMHG | HEART RATE: 81 BPM | WEIGHT: 175.6 LBS | HEIGHT: 65 IN

## 2024-10-24 DIAGNOSIS — R33.8 BENIGN PROSTATIC HYPERPLASIA WITH URINARY RETENTION: Primary | ICD-10-CM

## 2024-10-24 DIAGNOSIS — N20.1 URETERAL CALCULUS: ICD-10-CM

## 2024-10-24 DIAGNOSIS — N40.1 BENIGN PROSTATIC HYPERPLASIA WITH URINARY RETENTION: Primary | ICD-10-CM

## 2024-10-24 DIAGNOSIS — D68.9 COAGULOPATHY (MULTI): ICD-10-CM

## 2024-10-24 PROCEDURE — 1159F MED LIST DOCD IN RCRD: CPT | Performed by: STUDENT IN AN ORGANIZED HEALTH CARE EDUCATION/TRAINING PROGRAM

## 2024-10-24 PROCEDURE — 3074F SYST BP LT 130 MM HG: CPT | Performed by: STUDENT IN AN ORGANIZED HEALTH CARE EDUCATION/TRAINING PROGRAM

## 2024-10-24 PROCEDURE — 87186 SC STD MICRODIL/AGAR DIL: CPT

## 2024-10-24 PROCEDURE — 99214 OFFICE O/P EST MOD 30 MIN: CPT | Performed by: STUDENT IN AN ORGANIZED HEALTH CARE EDUCATION/TRAINING PROGRAM

## 2024-10-24 PROCEDURE — 1111F DSCHRG MED/CURRENT MED MERGE: CPT | Performed by: STUDENT IN AN ORGANIZED HEALTH CARE EDUCATION/TRAINING PROGRAM

## 2024-10-24 PROCEDURE — 87086 URINE CULTURE/COLONY COUNT: CPT

## 2024-10-24 PROCEDURE — 1160F RVW MEDS BY RX/DR IN RCRD: CPT | Performed by: STUDENT IN AN ORGANIZED HEALTH CARE EDUCATION/TRAINING PROGRAM

## 2024-10-24 PROCEDURE — 3078F DIAST BP <80 MM HG: CPT | Performed by: STUDENT IN AN ORGANIZED HEALTH CARE EDUCATION/TRAINING PROGRAM

## 2024-10-24 NOTE — PROGRESS NOTES
Nephrolithiasis (5-6mm, also was told that he may have an enlarged prostate)   Chief Complaint    Nephrolithiasis        Referring physician: No ref. provider found     SUBJECTIVE:  HPI:   Tom Velasquez is a 79 y.o. male with a history of HLD, GERD, Parkinson's disease who presents with BPH on tamsulosin with retention s/p Sanderson placement and a possible right UVJ stone.  Here with his wife.    Seen by Dr. Whaley.  Had retention and NOÉ (Cr 2.16/GFR 30 from baseline 0.8/90) hospitalized 10/7 and Sanderson placed.  CT at that time, which I reviewed, shows 126g prostate, Sanderson in place with decompressed bladder, 6mm stone at about the location of the right UVJ vs bladder stone in dependent portion of bladder.  Possibly mild right hydroureteronephrosis, left extrarenal pelvis.  Mild left perinephric stranding, no right stranding.    Feels well, Sanderson draining yellow urine.  Of course not comfortable would prefer to be free from catheters.    Repeat labs 10/8 after Sanderson placed did show improvement in renal function - Cr 1.61 from 2.16.    Past Medical History:   Diagnosis Date    Bilateral sensorineural hearing loss 08/23/2024    Dyslipidemia 04/11/2024    Gastroesophageal reflux disease with esophagitis without hemorrhage 04/11/2024    Idiopathic Parkinson's disease (Multi) 09/05/2022    Other specified health status     No pertinent past medical history    Overactive bladder 07/15/2024    Parkinson's disease (Multi) 05/16/2022    Parkinsonism    Primary hypertension 04/11/2024    Right inguinal hernia 10/21/2003    Unspecified abdominal hernia without obstruction or gangrene     Hernia        Past medical, surgical, family and social history in the chart was reviewed and is accurate including any additions to what is in this HPI.    ROS:  14-point review of systems negative except as noted above.    OBJECTIVE:  Visit Vitals  /74   Pulse 81     Body mass index is 29.22 kg/m².  Physical Exam   General:  No acute  distress  HEENT:  EOMI  CV:  Regular rate  Pulm:  Nonlabored respirations  Abd:  Soft, non-distended  :  No suprapubic or CVA tenderness  MSK:  No contractures  Neuro:  Motor intact  Psych:  Appropriate affect    Labs:  Lab Results   Component Value Date    WBC 8.1 10/08/2024    HGB 13.3 (L) 10/08/2024    HCT 40.1 (L) 10/08/2024     10/08/2024    CHOL 152 07/15/2024    TRIG 187 (H) 07/15/2024    HDL 53.4 07/15/2024    ALT 11 10/07/2024    AST 31 10/07/2024     10/08/2024    K 4.4 10/08/2024     10/08/2024    CREATININE 1.61 (H) 10/08/2024    BUN 26 (H) 10/08/2024    CO2 25 10/08/2024    TSH 1.97 04/11/2024    PSA 3.75 02/22/2023     Urine Culture (no units)   Date Value   10/24/2024 >100,000 Klebsiella pneumoniae/variicola (A)   10/24/2024 >100,000 Escherichia coli (A)      Lab Results   Component Value Date    PSA 3.75 02/22/2023       IMAGING:  All imaging discussed in HPI was independently reviewed.    ASSESSMENT:  BPH 126g, retention requiring Sanderson  Possible right UVJ vs bladder stone    PLAN:  CT A/P prone to distinguish UVJ vs bladder stone  Plan for robotic simple prostatectomy 11/20  If persistent UVJ stone may need ureteroscopy prior to surgical intervention for prostate  No need to exchange catheter prior to surgery  Ucx from cleaned end of catheter today, treat preop based on result  Serum labs and PAT visit  Follow-up 1 week after surgery Sanderson removal, 3 months flow/pvr    Jose Cooper MD    Problem List Items Addressed This Visit       Benign prostatic hyperplasia with urinary retention - Primary    Relevant Orders    Urine Culture (Completed)     Other Visit Diagnoses       Ureteral calculus        Relevant Orders    Urine Culture (Completed)    CT abdomen pelvis wo IV contrast

## 2024-10-27 LAB
BACTERIA UR CULT: ABNORMAL
BACTERIA UR CULT: ABNORMAL

## 2024-10-30 RX ORDER — CHLORHEXIDINE GLUCONATE 40 MG/ML
SOLUTION TOPICAL DAILY PRN
OUTPATIENT
Start: 2024-10-30

## 2024-10-30 RX ORDER — CIPROFLOXACIN 500 MG/1
500 TABLET ORAL 2 TIMES DAILY
Qty: 20 TABLET | Refills: 0 | Status: SHIPPED | OUTPATIENT
Start: 2024-10-30 | End: 2024-11-09

## 2024-10-30 RX ORDER — CEFAZOLIN SODIUM 2 G/100ML
2 INJECTION, SOLUTION INTRAVENOUS ONCE
OUTPATIENT
Start: 2024-10-30 | End: 2024-10-30

## 2024-11-04 ENCOUNTER — HOSPITAL ENCOUNTER (OUTPATIENT)
Dept: RADIOLOGY | Facility: CLINIC | Age: 79
Discharge: HOME | End: 2024-11-04
Payer: MEDICARE

## 2024-11-04 DIAGNOSIS — N20.1 URETERAL CALCULUS: ICD-10-CM

## 2024-11-04 PROCEDURE — 74176 CT ABD & PELVIS W/O CONTRAST: CPT | Performed by: RADIOLOGY

## 2024-11-04 PROCEDURE — 74176 CT ABD & PELVIS W/O CONTRAST: CPT

## 2024-11-06 ENCOUNTER — HOSPITAL ENCOUNTER (EMERGENCY)
Facility: HOSPITAL | Age: 79
Discharge: HOME | End: 2024-11-06
Payer: MEDICARE

## 2024-11-06 VITALS
WEIGHT: 175.71 LBS | DIASTOLIC BLOOD PRESSURE: 72 MMHG | RESPIRATION RATE: 20 BRPM | BODY MASS INDEX: 29.27 KG/M2 | OXYGEN SATURATION: 96 % | SYSTOLIC BLOOD PRESSURE: 131 MMHG | HEART RATE: 78 BPM | TEMPERATURE: 97.9 F | HEIGHT: 65 IN

## 2024-11-06 DIAGNOSIS — Z97.8 INDWELLING FOLEY CATHETER PRESENT: ICD-10-CM

## 2024-11-06 DIAGNOSIS — N39.0 UTI (URINARY TRACT INFECTION), BACTERIAL: Primary | ICD-10-CM

## 2024-11-06 DIAGNOSIS — A49.9 UTI (URINARY TRACT INFECTION), BACTERIAL: Primary | ICD-10-CM

## 2024-11-06 LAB
ANION GAP SERPL CALC-SCNC: 10 MMOL/L (ref 10–20)
APPEARANCE UR: ABNORMAL
BACTERIA #/AREA URNS AUTO: ABNORMAL /HPF
BASOPHILS # BLD AUTO: 0.04 X10*3/UL (ref 0–0.1)
BASOPHILS NFR BLD AUTO: 0.3 %
BILIRUB UR STRIP.AUTO-MCNC: NEGATIVE MG/DL
BUN SERPL-MCNC: 14 MG/DL (ref 6–23)
CALCIUM SERPL-MCNC: 9 MG/DL (ref 8.6–10.3)
CHLORIDE SERPL-SCNC: 101 MMOL/L (ref 98–107)
CO2 SERPL-SCNC: 28 MMOL/L (ref 21–32)
COLOR UR: ABNORMAL
CREAT SERPL-MCNC: 0.85 MG/DL (ref 0.5–1.3)
EGFRCR SERPLBLD CKD-EPI 2021: 88 ML/MIN/1.73M*2
EOSINOPHIL # BLD AUTO: 0.16 X10*3/UL (ref 0–0.4)
EOSINOPHIL NFR BLD AUTO: 1.2 %
ERYTHROCYTE [DISTWIDTH] IN BLOOD BY AUTOMATED COUNT: 12.4 % (ref 11.5–14.5)
GLUCOSE SERPL-MCNC: 107 MG/DL (ref 74–99)
GLUCOSE UR STRIP.AUTO-MCNC: NORMAL MG/DL
HCT VFR BLD AUTO: 37.7 % (ref 41–52)
HGB BLD-MCNC: 12.5 G/DL (ref 13.5–17.5)
HOLD SPECIMEN: NORMAL
IMM GRANULOCYTES # BLD AUTO: 0.08 X10*3/UL (ref 0–0.5)
IMM GRANULOCYTES NFR BLD AUTO: 0.6 % (ref 0–0.9)
KETONES UR STRIP.AUTO-MCNC: NEGATIVE MG/DL
LEUKOCYTE ESTERASE UR QL STRIP.AUTO: ABNORMAL
LYMPHOCYTES # BLD AUTO: 1.05 X10*3/UL (ref 0.8–3)
LYMPHOCYTES NFR BLD AUTO: 7.6 %
MCH RBC QN AUTO: 29.9 PG (ref 26–34)
MCHC RBC AUTO-ENTMCNC: 33.2 G/DL (ref 32–36)
MCV RBC AUTO: 90 FL (ref 80–100)
MONOCYTES # BLD AUTO: 1.18 X10*3/UL (ref 0.05–0.8)
MONOCYTES NFR BLD AUTO: 8.6 %
NEUTROPHILS # BLD AUTO: 11.26 X10*3/UL (ref 1.6–5.5)
NEUTROPHILS NFR BLD AUTO: 81.7 %
NITRITE UR QL STRIP.AUTO: NEGATIVE
NRBC BLD-RTO: 0 /100 WBCS (ref 0–0)
PH UR STRIP.AUTO: 6 [PH]
PLATELET # BLD AUTO: 249 X10*3/UL (ref 150–450)
POTASSIUM SERPL-SCNC: 4 MMOL/L (ref 3.5–5.3)
PROT UR STRIP.AUTO-MCNC: ABNORMAL MG/DL
RBC # BLD AUTO: 4.18 X10*6/UL (ref 4.5–5.9)
RBC # UR STRIP.AUTO: ABNORMAL /UL
RBC #/AREA URNS AUTO: >20 /HPF
SODIUM SERPL-SCNC: 135 MMOL/L (ref 136–145)
SP GR UR STRIP.AUTO: 1.02
UROBILINOGEN UR STRIP.AUTO-MCNC: NORMAL MG/DL
WBC # BLD AUTO: 13.8 X10*3/UL (ref 4.4–11.3)
WBC #/AREA URNS AUTO: >50 /HPF
WBC CLUMPS #/AREA URNS AUTO: ABNORMAL /HPF

## 2024-11-06 PROCEDURE — 87086 URINE CULTURE/COLONY COUNT: CPT | Mod: PARLAB | Performed by: NURSE PRACTITIONER

## 2024-11-06 PROCEDURE — 36415 COLL VENOUS BLD VENIPUNCTURE: CPT | Performed by: NURSE PRACTITIONER

## 2024-11-06 PROCEDURE — 2500000001 HC RX 250 WO HCPCS SELF ADMINISTERED DRUGS (ALT 637 FOR MEDICARE OP): Performed by: NURSE PRACTITIONER

## 2024-11-06 PROCEDURE — 81001 URINALYSIS AUTO W/SCOPE: CPT | Performed by: NURSE PRACTITIONER

## 2024-11-06 PROCEDURE — 85025 COMPLETE CBC W/AUTO DIFF WBC: CPT | Performed by: NURSE PRACTITIONER

## 2024-11-06 PROCEDURE — 99283 EMERGENCY DEPT VISIT LOW MDM: CPT

## 2024-11-06 PROCEDURE — 51702 INSERT TEMP BLADDER CATH: CPT

## 2024-11-06 PROCEDURE — 80048 BASIC METABOLIC PNL TOTAL CA: CPT | Performed by: NURSE PRACTITIONER

## 2024-11-06 RX ORDER — CIPROFLOXACIN 500 MG/1
500 TABLET ORAL ONCE
Status: COMPLETED | OUTPATIENT
Start: 2024-11-06 | End: 2024-11-06

## 2024-11-06 RX ORDER — CIPROFLOXACIN 500 MG/1
500 TABLET ORAL 2 TIMES DAILY
Qty: 14 TABLET | Refills: 0 | Status: SHIPPED | OUTPATIENT
Start: 2024-11-06 | End: 2024-11-13

## 2024-11-06 ASSESSMENT — COLUMBIA-SUICIDE SEVERITY RATING SCALE - C-SSRS
6. HAVE YOU EVER DONE ANYTHING, STARTED TO DO ANYTHING, OR PREPARED TO DO ANYTHING TO END YOUR LIFE?: NO
2. HAVE YOU ACTUALLY HAD ANY THOUGHTS OF KILLING YOURSELF?: NO
1. IN THE PAST MONTH, HAVE YOU WISHED YOU WERE DEAD OR WISHED YOU COULD GO TO SLEEP AND NOT WAKE UP?: NO

## 2024-11-06 ASSESSMENT — PAIN SCALES - GENERAL
PAINLEVEL_OUTOF10: 0 - NO PAIN
PAINLEVEL_OUTOF10: 0 - NO PAIN

## 2024-11-06 ASSESSMENT — PAIN DESCRIPTION - PAIN TYPE: TYPE: ACUTE PAIN

## 2024-11-06 ASSESSMENT — PAIN - FUNCTIONAL ASSESSMENT
PAIN_FUNCTIONAL_ASSESSMENT: 0-10
PAIN_FUNCTIONAL_ASSESSMENT: 0-10

## 2024-11-06 NOTE — ED TRIAGE NOTES
Pt has a gallegos that has been in and now is leaking around the catheter Pt now having blood in the catheter. States that it started a week ago but has gotten more and more bloody. Did not contact the urologist about this. States that if he walks the catheter will cause pain

## 2024-11-06 NOTE — ED PROVIDER NOTES
Limitations to History: None     HPI:      Tom Velasquez is a 79 y.o. with significant past medical history for HLD, GERD and Parkinson disease presenting to ED today from home with wife for possible Sanderson catheter malfunction.  Patient has had a leg bag at home for retention secondary to enlarged prostate.  He feels that the tubing is pulling on the end of his penis, he sees blood at the urinary meatus.  Appears to be draining fine.  No pain associated with the bleeding.  Denies fever/chills, cough/cold symptoms, chest pain, shortness of breath, nausea/vomiting, abdominal pain, hematuria, change in bowel habits or any other complaints.  No smoking, EtOH or drug use.  PCP is Dr. Dodson.     Additional History Obtained from: Triage/nursing notes reviewed.  Urology note Dr. Cooper 10/24/2024.    ------------------------------------------------------------------------------------------------------------------------------------------    VS: As documented in the triage note and EMR flowsheet from this visit were reviewed.    Physical Exam:  Gen: Pleasant elderly  male, awake and alert, oriented x 3.  Well-nourished and hydrated.  Nontoxic looking.  Head/Neck: NCAT, neck w/ FROM  Eyes: EOMI, PERRL, anicteric sclerae, noninjected conjunctivae  Ears: TMs clear b/l without sign of infection  Nose: Nares patent w/o rhinorrhea  Mouth:  MMM, no OP lesions noted  Heart: RRR no MRG  Lungs: CTA b/l no RRW, no increased work of breathing  Abdomen: Round and soft with bowel sounds, nontender.  No CVA tenderness.  : 16 Lao Sanderson catheter in place draining dark yellow urine into leg bag.  No gabo blood.  There is some dried blood around the urinary meatus.  Musculoskeletal: BELINDA x 4.  MSPs intact.  Skin intact.  No deformities.  Neurologic: Alert, symmetrical facies, phonates clearly, moves all extremities equally, responsive to touch, ambulates normally   Skin: Pink, warm and dry.  No rashes  noted  Psychological: calm, no SI/HI      ------------------------------------------------------------------------------------------------------------------------------------------    Medical Decision Makin y.o. with significant past medical history for HLD, GERD and Parkinson disease who was recently diagnosed with BPH and retention requiring Sanderson catheter placement and of October.  Patient also had a 6 mm right UVJ bladder stone.  Scheduled for prostatectomy .  Evaluated the bedside because he feels the Sanderson catheter is tugging with the leg bag, he noticed blood at the urinary meatus.  He thinks it is draining well.  Arrival to the ED, vital signs within normal limits.  Afebrile.  Abdomen soft and nontender.  Lungs clear.  No distress.  There is a small amount of blood at the urinary meatus where the Sanderson catheter is inserted.  It appears to be draining.     Differential includes UTI, NOÉ and persistent calculus with hydro.      I would like to run UA and culture.  Will exchange the Sanderson catheter and place a leg bag on to see if the patient is more comfortable.  IV established, basic labs checked.  CT from 2 days ago has not been read.    ED Course as of 24 1232      1146 Mild leukocytosis of 13.8 with a left shift.  No evidence of anemia.  BMP is still pending.  Urine shows evidence of infection with leukocyte esterase, white cells, blood and 1+ bacteria.  Culture report from 10/24 reviewed, will place the patient on Cipro twice daily x 7 days for UTI.  Initial dose given in the emergency department.  Urine culture pending from today.  Prescription sent to pharmacy.  Antibiotic selection and plan discussed with Dr. Cooper.  Resume normal medications.  Increase fluids.  Patient was given both leg bag and the larger bag.  Sanderson care and bag changes reviewed with wife.  She verbalizes understanding.  Red flags and return precautions discussed.  All questions were answered.   Follow-up for prostatectomy on 11/20 as scheduled.  Diagnosis and treatment plan reviewed with wife, she verbalizes understanding and is in agreement.  Condition stable for discharge. [SB]      ED Course User Index  [SB] NICKI Jernigan         Diagnoses as of 11/06/24 1232   UTI (urinary tract infection), bacterial   Indwelling Sanderson catheter present       EKG interpreted by myself (ED attending physician): None    Chronic Medical Conditions Significantly Affecting Care: None    External Records Reviewed: I reviewed recent and relevant outside records including: None    Discussion of Management with Other Providers: None    I discussed the patient/results with: Dr. Cooper of urology       NICKI Jernigan  11/06/24 1233

## 2024-11-06 NOTE — DISCHARGE INSTRUCTIONS
You have a UTI, placed on Cipro twice a day for 7 days, first dose given in the emergency department.  Remainder sent to your pharmacy.  Resume normal medications.  Increase fluids.  Maintain Sanderson catheter.  Utilize larger bag when you are at home, leg bag can be used when out in public.  Large bag is better for nighttime.  UA normal kidney function today.  CT from 2 days ago showed that the stone had passed into the bladder.  Follow-up with urology on 11/20 as scheduled for prostatectomy.

## 2024-11-09 LAB — BACTERIA UR CULT: ABNORMAL

## 2024-11-11 ENCOUNTER — TELEPHONE (OUTPATIENT)
Dept: PHARMACY | Facility: HOSPITAL | Age: 79
End: 2024-11-11
Payer: COMMERCIAL

## 2024-11-11 NOTE — PROGRESS NOTES
EDPD Note: Antibiotics Reviewed and Warranted    Contacted Mr./Mrs./Ms. Tom Velasquez regarding a positive urine culture/result that was taken during their recent emergency room visit. I completed education with spouse . The patient is being treated appropriately with ciprofloxacin 500 mg BID x 7 days. Pts wife reported patient was having pain on urination prior to going to ED. Pt has reported resolution of symptoms. Advised to finish last dose. Patient has urology procedure scheduled next week, will have to take Cipro prior to surgery again.      Susceptibility data from last 90 days.  Collected Specimen Info Organism Amoxicillin/Clavulanate Ampicillin Ampicillin/Sulbactam Cefazolin Cefazolin (uncomplicated UTIs only) Ciprofloxacin Gentamicin Nitrofurantoin Piperacillin/Tazobactam Trimethoprim/Sulfamethoxazole   11/06/24 Urine from Indwelling (Sanderson) Catheter Klebsiella pneumoniae/variicola  S  R  S  S  S  S  S  S  S  S   10/24/24 Urine from Clean Catch/Voided Klebsiella pneumoniae/variicola  S  R  S  S  S  S  S  S  S  S     Escherichia coli   S   S  S  S  S  S  S  R        No further follow up needed from EDPD Team.     Nataliya Berman [facility]

## 2024-11-19 ENCOUNTER — ANESTHESIA EVENT (OUTPATIENT)
Dept: OPERATING ROOM | Facility: HOSPITAL | Age: 79
End: 2024-11-19
Payer: MEDICARE

## 2024-11-19 NOTE — PREPROCEDURE INSTRUCTIONS
Current Medications   Medication Instructions    amantadine (Symmetrel) 100 mg capsule Take morning of surgery with sip of water    atorvastatin (Lipitor) 20 mg tablet Take morning of surgery with sip of water    carbidopa-levodopa (Sinemet)  mg tablet Take morning of surgery with sip of water    pantoprazole (ProtoNix) 40 mg EC tablet Take morning of surgery with sip of water    tamsulosin (Flomax) 0.4 mg 24 hr capsule Take morning of surgery with sip of water            NPO Instructions:    Do not eat any food after midnight the night before your surgery/procedure.  You may have 13 ounces of clear liquids until 5:30 AM (TWO hours before surgery). This includes water, black tea/coffee, (no milk or cream) apple juice and electrolyte drinks (Gatorade).    Additional Instructions:     Day of Surgery: Arrive in registration at 6:00 AM for 7:30 AM surgery    Enter through the main entrance of [facility], located at [address]. Proceed to registration, located in the back right hand corner. You will need your ID and insurance card for registration. Please ensure you have a responsible adult to drive you home.     Take a shower the morning of or night before your procedure. After you shower avoid lotions, powders, deodorants or anything applied to the skin. If you wear contacts or glasses, wear the glasses. If you do not have glasses, please bring a case for your contacts. You may wear hearing aids and dentures, bring a case for them or we will provide one. Make sure you wear something loose and comfortable. Keep in mind your surgery type and wear something that will accommodate incisions or bandages. Please remove all jewelry.     For further questions Radha ROMERO can be contacted at [phone] between 7AM-3PM.

## 2024-11-20 ENCOUNTER — HOSPITAL ENCOUNTER (OUTPATIENT)
Facility: HOSPITAL | Age: 79
Discharge: HOME HEALTH | End: 2024-11-21
Attending: STUDENT IN AN ORGANIZED HEALTH CARE EDUCATION/TRAINING PROGRAM | Admitting: STUDENT IN AN ORGANIZED HEALTH CARE EDUCATION/TRAINING PROGRAM
Payer: MEDICARE

## 2024-11-20 ENCOUNTER — ANESTHESIA (OUTPATIENT)
Dept: OPERATING ROOM | Facility: HOSPITAL | Age: 79
End: 2024-11-20
Payer: MEDICARE

## 2024-11-20 DIAGNOSIS — N40.1 BENIGN PROSTATIC HYPERPLASIA WITH URINARY RETENTION: Primary | ICD-10-CM

## 2024-11-20 DIAGNOSIS — G89.18 POSTOPERATIVE PAIN: ICD-10-CM

## 2024-11-20 DIAGNOSIS — Z90.79 HISTORY OF ROBOT-ASSISTED LAPAROSCOPIC RADICAL PROSTATECTOMY: ICD-10-CM

## 2024-11-20 DIAGNOSIS — K59.00 CONSTIPATION, UNSPECIFIED CONSTIPATION TYPE: ICD-10-CM

## 2024-11-20 DIAGNOSIS — R33.8 BENIGN PROSTATIC HYPERPLASIA WITH URINARY RETENTION: Primary | ICD-10-CM

## 2024-11-20 DIAGNOSIS — N30.00 ACUTE CYSTITIS WITHOUT HEMATURIA: ICD-10-CM

## 2024-11-20 LAB
ABO GROUP (TYPE) IN BLOOD: NORMAL
ABO GROUP (TYPE) IN BLOOD: NORMAL
ANION GAP SERPL CALC-SCNC: 12 MMOL/L (ref 10–20)
ANTIBODY SCREEN: NORMAL
BLOOD EXPIRATION DATE: NORMAL
BUN SERPL-MCNC: 24 MG/DL (ref 6–23)
CALCIUM SERPL-MCNC: 8.3 MG/DL (ref 8.6–10.3)
CHLORIDE SERPL-SCNC: 103 MMOL/L (ref 98–107)
CO2 SERPL-SCNC: 24 MMOL/L (ref 21–32)
CREAT SERPL-MCNC: 1.05 MG/DL (ref 0.5–1.3)
DISPENSE STATUS: NORMAL
EGFRCR SERPLBLD CKD-EPI 2021: 72 ML/MIN/1.73M*2
ERYTHROCYTE [DISTWIDTH] IN BLOOD BY AUTOMATED COUNT: 12.7 % (ref 11.5–14.5)
GLUCOSE SERPL-MCNC: 182 MG/DL (ref 74–99)
HCT VFR BLD AUTO: 34.9 % (ref 41–52)
HGB BLD-MCNC: 11.6 G/DL (ref 13.5–17.5)
MCH RBC QN AUTO: 29.7 PG (ref 26–34)
MCHC RBC AUTO-ENTMCNC: 33.2 G/DL (ref 32–36)
MCV RBC AUTO: 89 FL (ref 80–100)
NRBC BLD-RTO: 0 /100 WBCS (ref 0–0)
PLATELET # BLD AUTO: 254 X10*3/UL (ref 150–450)
POTASSIUM SERPL-SCNC: 4.5 MMOL/L (ref 3.5–5.3)
PRODUCT BLOOD TYPE: 6200
PRODUCT CODE: NORMAL
RBC # BLD AUTO: 3.91 X10*6/UL (ref 4.5–5.9)
RH FACTOR (ANTIGEN D): NORMAL
RH FACTOR (ANTIGEN D): NORMAL
SODIUM SERPL-SCNC: 134 MMOL/L (ref 136–145)
UNIT ABO: NORMAL
UNIT NUMBER: NORMAL
UNIT RH: NORMAL
UNIT VOLUME: 350
WBC # BLD AUTO: 25.5 X10*3/UL (ref 4.4–11.3)
XM INTEP: NORMAL

## 2024-11-20 PROCEDURE — 36430 TRANSFUSION BLD/BLD COMPNT: CPT | Performed by: NURSE ANESTHETIST, CERTIFIED REGISTERED

## 2024-11-20 PROCEDURE — 86923 COMPATIBILITY TEST ELECTRIC: CPT

## 2024-11-20 PROCEDURE — 88307 TISSUE EXAM BY PATHOLOGIST: CPT | Performed by: PATHOLOGY

## 2024-11-20 PROCEDURE — P9016 RBC LEUKOCYTES REDUCED: HCPCS

## 2024-11-20 PROCEDURE — 85027 COMPLETE CBC AUTOMATED: CPT

## 2024-11-20 PROCEDURE — 2500000004 HC RX 250 GENERAL PHARMACY W/ HCPCS (ALT 636 FOR OP/ED)

## 2024-11-20 PROCEDURE — 3600000017 HC OR TIME - EACH INCREMENTAL 1 MINUTE - PROCEDURE LEVEL SIX: Performed by: STUDENT IN AN ORGANIZED HEALTH CARE EDUCATION/TRAINING PROGRAM

## 2024-11-20 PROCEDURE — 3700000001 HC GENERAL ANESTHESIA TIME - INITIAL BASE CHARGE: Performed by: STUDENT IN AN ORGANIZED HEALTH CARE EDUCATION/TRAINING PROGRAM

## 2024-11-20 PROCEDURE — 93005 ELECTROCARDIOGRAM TRACING: CPT

## 2024-11-20 PROCEDURE — 2500000001 HC RX 250 WO HCPCS SELF ADMINISTERED DRUGS (ALT 637 FOR MEDICARE OP)

## 2024-11-20 PROCEDURE — G0378 HOSPITAL OBSERVATION PER HR: HCPCS

## 2024-11-20 PROCEDURE — 93010 ELECTROCARDIOGRAM REPORT: CPT | Performed by: STUDENT IN AN ORGANIZED HEALTH CARE EDUCATION/TRAINING PROGRAM

## 2024-11-20 PROCEDURE — 2500000004 HC RX 250 GENERAL PHARMACY W/ HCPCS (ALT 636 FOR OP/ED): Mod: JZ | Performed by: STUDENT IN AN ORGANIZED HEALTH CARE EDUCATION/TRAINING PROGRAM

## 2024-11-20 PROCEDURE — 2500000004 HC RX 250 GENERAL PHARMACY W/ HCPCS (ALT 636 FOR OP/ED): Performed by: STUDENT IN AN ORGANIZED HEALTH CARE EDUCATION/TRAINING PROGRAM

## 2024-11-20 PROCEDURE — 86900 BLOOD TYPING SEROLOGIC ABO: CPT | Performed by: STUDENT IN AN ORGANIZED HEALTH CARE EDUCATION/TRAINING PROGRAM

## 2024-11-20 PROCEDURE — 55867 LAPS SURG PRST8ECT SMPL STOT: CPT | Performed by: STUDENT IN AN ORGANIZED HEALTH CARE EDUCATION/TRAINING PROGRAM

## 2024-11-20 PROCEDURE — 2500000005 HC RX 250 GENERAL PHARMACY W/O HCPCS: Performed by: STUDENT IN AN ORGANIZED HEALTH CARE EDUCATION/TRAINING PROGRAM

## 2024-11-20 PROCEDURE — 2500000001 HC RX 250 WO HCPCS SELF ADMINISTERED DRUGS (ALT 637 FOR MEDICARE OP): Performed by: STUDENT IN AN ORGANIZED HEALTH CARE EDUCATION/TRAINING PROGRAM

## 2024-11-20 PROCEDURE — 2500000002 HC RX 250 W HCPCS SELF ADMINISTERED DRUGS (ALT 637 FOR MEDICARE OP, ALT 636 FOR OP/ED): Performed by: STUDENT IN AN ORGANIZED HEALTH CARE EDUCATION/TRAINING PROGRAM

## 2024-11-20 PROCEDURE — C1751 CATH, INF, PER/CENT/MIDLINE: HCPCS | Performed by: STUDENT IN AN ORGANIZED HEALTH CARE EDUCATION/TRAINING PROGRAM

## 2024-11-20 PROCEDURE — 2780000003 HC OR 278 NO HCPCS: Performed by: STUDENT IN AN ORGANIZED HEALTH CARE EDUCATION/TRAINING PROGRAM

## 2024-11-20 PROCEDURE — 37799 UNLISTED PX VASCULAR SURGERY: CPT | Performed by: STUDENT IN AN ORGANIZED HEALTH CARE EDUCATION/TRAINING PROGRAM

## 2024-11-20 PROCEDURE — P9045 ALBUMIN (HUMAN), 5%, 250 ML: HCPCS | Mod: JZ

## 2024-11-20 PROCEDURE — 2500000004 HC RX 250 GENERAL PHARMACY W/ HCPCS (ALT 636 FOR OP/ED): Performed by: ANESTHESIOLOGY

## 2024-11-20 PROCEDURE — 3600000018 HC OR TIME - INITIAL BASE CHARGE - PROCEDURE LEVEL SIX: Performed by: STUDENT IN AN ORGANIZED HEALTH CARE EDUCATION/TRAINING PROGRAM

## 2024-11-20 PROCEDURE — 86901 BLOOD TYPING SEROLOGIC RH(D): CPT | Performed by: STUDENT IN AN ORGANIZED HEALTH CARE EDUCATION/TRAINING PROGRAM

## 2024-11-20 PROCEDURE — 7100000001 HC RECOVERY ROOM TIME - INITIAL BASE CHARGE: Performed by: STUDENT IN AN ORGANIZED HEALTH CARE EDUCATION/TRAINING PROGRAM

## 2024-11-20 PROCEDURE — 80048 BASIC METABOLIC PNL TOTAL CA: CPT

## 2024-11-20 PROCEDURE — 88307 TISSUE EXAM BY PATHOLOGIST: CPT | Mod: TC,PARLAB | Performed by: STUDENT IN AN ORGANIZED HEALTH CARE EDUCATION/TRAINING PROGRAM

## 2024-11-20 PROCEDURE — 7100000002 HC RECOVERY ROOM TIME - EACH INCREMENTAL 1 MINUTE: Performed by: STUDENT IN AN ORGANIZED HEALTH CARE EDUCATION/TRAINING PROGRAM

## 2024-11-20 PROCEDURE — 2720000007 HC OR 272 NO HCPCS: Performed by: STUDENT IN AN ORGANIZED HEALTH CARE EDUCATION/TRAINING PROGRAM

## 2024-11-20 PROCEDURE — 3700000002 HC GENERAL ANESTHESIA TIME - EACH INCREMENTAL 1 MINUTE: Performed by: STUDENT IN AN ORGANIZED HEALTH CARE EDUCATION/TRAINING PROGRAM

## 2024-11-20 RX ORDER — ALBUTEROL SULFATE 90 UG/1
INHALANT RESPIRATORY (INHALATION) AS NEEDED
Status: DISCONTINUED | OUTPATIENT
Start: 2024-11-20 | End: 2024-11-20

## 2024-11-20 RX ORDER — POLYETHYLENE GLYCOL 3350 17 G/17G
17 POWDER, FOR SOLUTION ORAL DAILY
Status: DISCONTINUED | OUTPATIENT
Start: 2024-11-20 | End: 2024-11-21 | Stop reason: HOSPADM

## 2024-11-20 RX ORDER — FENTANYL CITRATE 50 UG/ML
INJECTION, SOLUTION INTRAMUSCULAR; INTRAVENOUS AS NEEDED
Status: DISCONTINUED | OUTPATIENT
Start: 2024-11-20 | End: 2024-11-20

## 2024-11-20 RX ORDER — WATER 1 ML/ML
IRRIGANT IRRIGATION AS NEEDED
Status: DISCONTINUED | OUTPATIENT
Start: 2024-11-20 | End: 2024-11-20 | Stop reason: HOSPADM

## 2024-11-20 RX ORDER — SODIUM CHLORIDE 9 MG/ML
75 INJECTION, SOLUTION INTRAVENOUS CONTINUOUS
Status: DISCONTINUED | OUTPATIENT
Start: 2024-11-20 | End: 2024-11-21

## 2024-11-20 RX ORDER — SCOLOPAMINE TRANSDERMAL SYSTEM 1 MG/1
1 PATCH, EXTENDED RELEASE TRANSDERMAL ONCE
Status: DISCONTINUED | OUTPATIENT
Start: 2024-11-20 | End: 2024-11-20 | Stop reason: HOSPADM

## 2024-11-20 RX ORDER — SODIUM CHLORIDE, SODIUM LACTATE, POTASSIUM CHLORIDE, CALCIUM CHLORIDE 600; 310; 30; 20 MG/100ML; MG/100ML; MG/100ML; MG/100ML
INJECTION, SOLUTION INTRAVENOUS CONTINUOUS PRN
Status: DISCONTINUED | OUTPATIENT
Start: 2024-11-20 | End: 2024-11-20

## 2024-11-20 RX ORDER — PHENYLEPHRINE HCL IN 0.9% NACL 1 MG/10 ML
SYRINGE (ML) INTRAVENOUS AS NEEDED
Status: DISCONTINUED | OUTPATIENT
Start: 2024-11-20 | End: 2024-11-20

## 2024-11-20 RX ORDER — ALBUTEROL SULFATE 0.83 MG/ML
2.5 SOLUTION RESPIRATORY (INHALATION) ONCE AS NEEDED
Status: DISCONTINUED | OUTPATIENT
Start: 2024-11-20 | End: 2024-11-20 | Stop reason: HOSPADM

## 2024-11-20 RX ORDER — ONDANSETRON HYDROCHLORIDE 2 MG/ML
4 INJECTION, SOLUTION INTRAVENOUS ONCE AS NEEDED
Status: DISCONTINUED | OUTPATIENT
Start: 2024-11-20 | End: 2024-11-20 | Stop reason: HOSPADM

## 2024-11-20 RX ORDER — CEFAZOLIN SODIUM 2 G/100ML
2 INJECTION, SOLUTION INTRAVENOUS ONCE
Status: COMPLETED | OUTPATIENT
Start: 2024-11-20 | End: 2024-11-20

## 2024-11-20 RX ORDER — CEFAZOLIN 1 G/1
INJECTION, POWDER, FOR SOLUTION INTRAVENOUS AS NEEDED
Status: DISCONTINUED | OUTPATIENT
Start: 2024-11-20 | End: 2024-11-20

## 2024-11-20 RX ORDER — ACETAMINOPHEN 325 MG/1
975 TABLET ORAL ONCE
Status: DISCONTINUED | OUTPATIENT
Start: 2024-11-20 | End: 2024-11-20 | Stop reason: HOSPADM

## 2024-11-20 RX ORDER — ACETAMINOPHEN 325 MG/1
650 TABLET ORAL EVERY 6 HOURS
Status: DISCONTINUED | OUTPATIENT
Start: 2024-11-20 | End: 2024-11-21 | Stop reason: HOSPADM

## 2024-11-20 RX ORDER — ROCURONIUM BROMIDE 10 MG/ML
INJECTION, SOLUTION INTRAVENOUS AS NEEDED
Status: DISCONTINUED | OUTPATIENT
Start: 2024-11-20 | End: 2024-11-20

## 2024-11-20 RX ORDER — SODIUM CHLORIDE 0.9 G/100ML
IRRIGANT IRRIGATION AS NEEDED
Status: DISCONTINUED | OUTPATIENT
Start: 2024-11-20 | End: 2024-11-20 | Stop reason: HOSPADM

## 2024-11-20 RX ORDER — KETOROLAC TROMETHAMINE 30 MG/ML
15 INJECTION, SOLUTION INTRAMUSCULAR; INTRAVENOUS EVERY 6 HOURS PRN
Status: DISCONTINUED | OUTPATIENT
Start: 2024-11-20 | End: 2024-11-21 | Stop reason: HOSPADM

## 2024-11-20 RX ORDER — PROPOFOL 10 MG/ML
INJECTION, EMULSION INTRAVENOUS AS NEEDED
Status: DISCONTINUED | OUTPATIENT
Start: 2024-11-20 | End: 2024-11-20

## 2024-11-20 RX ORDER — AMANTADINE HYDROCHLORIDE 100 MG/1
100 CAPSULE, GELATIN COATED ORAL DAILY
Status: DISCONTINUED | OUTPATIENT
Start: 2024-11-21 | End: 2024-11-21 | Stop reason: HOSPADM

## 2024-11-20 RX ORDER — HEPARIN SODIUM 5000 [USP'U]/ML
5000 INJECTION, SOLUTION INTRAVENOUS; SUBCUTANEOUS EVERY 8 HOURS
Status: DISCONTINUED | OUTPATIENT
Start: 2024-11-20 | End: 2024-11-20

## 2024-11-20 RX ORDER — PANTOPRAZOLE SODIUM 40 MG/1
40 TABLET, DELAYED RELEASE ORAL DAILY
Status: DISCONTINUED | OUTPATIENT
Start: 2024-11-20 | End: 2024-11-21 | Stop reason: HOSPADM

## 2024-11-20 RX ORDER — LIDOCAINE HCL/PF 100 MG/5ML
SYRINGE (ML) INTRAVENOUS AS NEEDED
Status: DISCONTINUED | OUTPATIENT
Start: 2024-11-20 | End: 2024-11-20

## 2024-11-20 RX ORDER — TAMSULOSIN HYDROCHLORIDE 0.4 MG/1
0.4 CAPSULE ORAL
Status: DISCONTINUED | OUTPATIENT
Start: 2024-11-20 | End: 2024-11-21 | Stop reason: HOSPADM

## 2024-11-20 RX ORDER — ALBUMIN HUMAN 50 G/1000ML
SOLUTION INTRAVENOUS AS NEEDED
Status: DISCONTINUED | OUTPATIENT
Start: 2024-11-20 | End: 2024-11-20

## 2024-11-20 RX ORDER — MORPHINE SULFATE 2 MG/ML
2 INJECTION, SOLUTION INTRAMUSCULAR; INTRAVENOUS EVERY 5 MIN PRN
Status: DISCONTINUED | OUTPATIENT
Start: 2024-11-20 | End: 2024-11-20 | Stop reason: HOSPADM

## 2024-11-20 RX ORDER — HYDROMORPHONE HYDROCHLORIDE 1 MG/ML
1 INJECTION, SOLUTION INTRAMUSCULAR; INTRAVENOUS; SUBCUTANEOUS EVERY 5 MIN PRN
Status: DISCONTINUED | OUTPATIENT
Start: 2024-11-20 | End: 2024-11-20 | Stop reason: HOSPADM

## 2024-11-20 RX ORDER — DIPHENHYDRAMINE HYDROCHLORIDE 50 MG/ML
25 INJECTION INTRAMUSCULAR; INTRAVENOUS ONCE AS NEEDED
Status: DISCONTINUED | OUTPATIENT
Start: 2024-11-20 | End: 2024-11-20 | Stop reason: HOSPADM

## 2024-11-20 RX ORDER — ATORVASTATIN CALCIUM 20 MG/1
20 TABLET, FILM COATED ORAL DAILY
Status: DISCONTINUED | OUTPATIENT
Start: 2024-11-20 | End: 2024-11-21 | Stop reason: HOSPADM

## 2024-11-20 RX ORDER — CHLORHEXIDINE GLUCONATE 40 MG/ML
SOLUTION TOPICAL DAILY PRN
Status: DISCONTINUED | OUTPATIENT
Start: 2024-11-20 | End: 2024-11-20

## 2024-11-20 RX ORDER — HYDRALAZINE HYDROCHLORIDE 20 MG/ML
10 INJECTION INTRAMUSCULAR; INTRAVENOUS EVERY 30 MIN PRN
Status: DISCONTINUED | OUTPATIENT
Start: 2024-11-20 | End: 2024-11-20 | Stop reason: HOSPADM

## 2024-11-20 RX ORDER — METOPROLOL TARTRATE 1 MG/ML
5 INJECTION, SOLUTION INTRAVENOUS ONCE
Status: DISCONTINUED | OUTPATIENT
Start: 2024-11-20 | End: 2024-11-20 | Stop reason: HOSPADM

## 2024-11-20 RX ORDER — CARBIDOPA AND LEVODOPA 25; 100 MG/1; MG/1
1.5 TABLET ORAL 3 TIMES DAILY
Status: DISCONTINUED | OUTPATIENT
Start: 2024-11-20 | End: 2024-11-21 | Stop reason: HOSPADM

## 2024-11-20 RX ORDER — MEPERIDINE HYDROCHLORIDE 25 MG/ML
12.5 INJECTION INTRAMUSCULAR; INTRAVENOUS; SUBCUTANEOUS EVERY 10 MIN PRN
Status: DISCONTINUED | OUTPATIENT
Start: 2024-11-20 | End: 2024-11-20 | Stop reason: HOSPADM

## 2024-11-20 RX ORDER — SODIUM CHLORIDE, SODIUM LACTATE, POTASSIUM CHLORIDE, CALCIUM CHLORIDE 600; 310; 30; 20 MG/100ML; MG/100ML; MG/100ML; MG/100ML
100 INJECTION, SOLUTION INTRAVENOUS CONTINUOUS
Status: ACTIVE | OUTPATIENT
Start: 2024-11-20 | End: 2024-11-20

## 2024-11-20 RX ORDER — BUPIVACAINE HYDROCHLORIDE 5 MG/ML
INJECTION, SOLUTION PERINEURAL AS NEEDED
Status: DISCONTINUED | OUTPATIENT
Start: 2024-11-20 | End: 2024-11-20 | Stop reason: HOSPADM

## 2024-11-20 RX ORDER — FAMOTIDINE 10 MG/ML
20 INJECTION INTRAVENOUS ONCE
Status: DISCONTINUED | OUTPATIENT
Start: 2024-11-20 | End: 2024-11-20 | Stop reason: HOSPADM

## 2024-11-20 RX ORDER — LABETALOL HYDROCHLORIDE 5 MG/ML
10 INJECTION, SOLUTION INTRAVENOUS ONCE AS NEEDED
Status: DISCONTINUED | OUTPATIENT
Start: 2024-11-20 | End: 2024-11-20 | Stop reason: HOSPADM

## 2024-11-20 RX ORDER — LABETALOL HYDROCHLORIDE 5 MG/ML
INJECTION, SOLUTION INTRAVENOUS AS NEEDED
Status: DISCONTINUED | OUTPATIENT
Start: 2024-11-20 | End: 2024-11-20

## 2024-11-20 RX ORDER — MIDAZOLAM HYDROCHLORIDE 1 MG/ML
1 INJECTION, SOLUTION INTRAMUSCULAR; INTRAVENOUS ONCE AS NEEDED
Status: DISCONTINUED | OUTPATIENT
Start: 2024-11-20 | End: 2024-11-20 | Stop reason: HOSPADM

## 2024-11-20 RX ORDER — ONDANSETRON HYDROCHLORIDE 2 MG/ML
INJECTION, SOLUTION INTRAVENOUS AS NEEDED
Status: DISCONTINUED | OUTPATIENT
Start: 2024-11-20 | End: 2024-11-20

## 2024-11-20 RX ORDER — ONDANSETRON HYDROCHLORIDE 2 MG/ML
4 INJECTION, SOLUTION INTRAVENOUS EVERY 6 HOURS PRN
Status: DISCONTINUED | OUTPATIENT
Start: 2024-11-20 | End: 2024-11-21 | Stop reason: HOSPADM

## 2024-11-20 RX ADMIN — MORPHINE SULFATE 2 MG: 2 INJECTION, SOLUTION INTRAMUSCULAR; INTRAVENOUS at 16:13

## 2024-11-20 RX ADMIN — PANTOPRAZOLE SODIUM 40 MG: 40 TABLET, DELAYED RELEASE ORAL at 21:24

## 2024-11-20 RX ADMIN — TAMSULOSIN HYDROCHLORIDE 0.4 MG: 0.4 CAPSULE ORAL at 21:24

## 2024-11-20 RX ADMIN — ATORVASTATIN CALCIUM 20 MG: 20 TABLET, FILM COATED ORAL at 21:23

## 2024-11-20 RX ADMIN — ACETAMINOPHEN 650 MG: 325 TABLET, FILM COATED ORAL at 21:12

## 2024-11-20 RX ADMIN — MORPHINE SULFATE 2 MG: 2 INJECTION, SOLUTION INTRAMUSCULAR; INTRAVENOUS at 16:25

## 2024-11-20 RX ADMIN — SODIUM CHLORIDE 75 ML/HR: 9 INJECTION, SOLUTION INTRAVENOUS at 18:31

## 2024-11-20 RX ADMIN — CARBIDOPA AND LEVODOPA 1.5 TABLET: 25; 100 TABLET ORAL at 21:24

## 2024-11-20 SDOH — SOCIAL STABILITY: SOCIAL INSECURITY: WITHIN THE LAST YEAR, HAVE YOU BEEN AFRAID OF YOUR PARTNER OR EX-PARTNER?: NO

## 2024-11-20 SDOH — SOCIAL STABILITY: SOCIAL INSECURITY: DOES ANYONE TRY TO KEEP YOU FROM HAVING/CONTACTING OTHER FRIENDS OR DOING THINGS OUTSIDE YOUR HOME?: NO

## 2024-11-20 SDOH — HEALTH STABILITY: MENTAL HEALTH: CURRENT SMOKER: 0

## 2024-11-20 SDOH — ECONOMIC STABILITY: FOOD INSECURITY: WITHIN THE PAST 12 MONTHS, YOU WORRIED THAT YOUR FOOD WOULD RUN OUT BEFORE YOU GOT THE MONEY TO BUY MORE.: NEVER TRUE

## 2024-11-20 SDOH — SOCIAL STABILITY: SOCIAL INSECURITY: ARE YOU OR HAVE YOU BEEN THREATENED OR ABUSED PHYSICALLY, EMOTIONALLY, OR SEXUALLY BY ANYONE?: NO

## 2024-11-20 SDOH — SOCIAL STABILITY: SOCIAL INSECURITY: DO YOU FEEL ANYONE HAS EXPLOITED OR TAKEN ADVANTAGE OF YOU FINANCIALLY OR OF YOUR PERSONAL PROPERTY?: NO

## 2024-11-20 SDOH — SOCIAL STABILITY: SOCIAL INSECURITY: ARE THERE ANY APPARENT SIGNS OF INJURIES/BEHAVIORS THAT COULD BE RELATED TO ABUSE/NEGLECT?: NO

## 2024-11-20 SDOH — SOCIAL STABILITY: SOCIAL INSECURITY: WITHIN THE LAST YEAR, HAVE YOU BEEN HUMILIATED OR EMOTIONALLY ABUSED IN OTHER WAYS BY YOUR PARTNER OR EX-PARTNER?: NO

## 2024-11-20 SDOH — ECONOMIC STABILITY: INCOME INSECURITY: IN THE PAST 12 MONTHS HAS THE ELECTRIC, GAS, OIL, OR WATER COMPANY THREATENED TO SHUT OFF SERVICES IN YOUR HOME?: NO

## 2024-11-20 SDOH — SOCIAL STABILITY: SOCIAL INSECURITY: DO YOU FEEL UNSAFE GOING BACK TO THE PLACE WHERE YOU ARE LIVING?: NO

## 2024-11-20 SDOH — SOCIAL STABILITY: SOCIAL INSECURITY: ABUSE: ADULT

## 2024-11-20 SDOH — SOCIAL STABILITY: SOCIAL INSECURITY: HAVE YOU HAD ANY THOUGHTS OF HARMING ANYONE ELSE?: NO

## 2024-11-20 SDOH — SOCIAL STABILITY: SOCIAL INSECURITY: HAVE YOU HAD THOUGHTS OF HARMING ANYONE ELSE?: NO

## 2024-11-20 SDOH — ECONOMIC STABILITY: FOOD INSECURITY: WITHIN THE PAST 12 MONTHS, THE FOOD YOU BOUGHT JUST DIDN'T LAST AND YOU DIDN'T HAVE MONEY TO GET MORE.: NEVER TRUE

## 2024-11-20 SDOH — SOCIAL STABILITY: SOCIAL INSECURITY: HAS ANYONE EVER THREATENED TO HURT YOUR FAMILY OR YOUR PETS?: NO

## 2024-11-20 SDOH — SOCIAL STABILITY: SOCIAL INSECURITY: WERE YOU ABLE TO COMPLETE ALL THE BEHAVIORAL HEALTH SCREENINGS?: YES

## 2024-11-20 ASSESSMENT — PAIN SCALES - GENERAL
PAINLEVEL_OUTOF10: 0 - NO PAIN
PAINLEVEL_OUTOF10: 5 - MODERATE PAIN
PAINLEVEL_OUTOF10: 0 - NO PAIN
PAINLEVEL_OUTOF10: 6
PAINLEVEL_OUTOF10: 0 - NO PAIN
PAINLEVEL_OUTOF10: 6
PAINLEVEL_OUTOF10: 0 - NO PAIN

## 2024-11-20 ASSESSMENT — COGNITIVE AND FUNCTIONAL STATUS - GENERAL
CLIMB 3 TO 5 STEPS WITH RAILING: A LOT
MOVING TO AND FROM BED TO CHAIR: A LITTLE
DAILY ACTIVITIY SCORE: 21
WALKING IN HOSPITAL ROOM: A LITTLE
PATIENT BASELINE BEDBOUND: NO
TURNING FROM BACK TO SIDE WHILE IN FLAT BAD: A LITTLE
MOBILITY SCORE: 18
DRESSING REGULAR LOWER BODY CLOTHING: A LITTLE
CLIMB 3 TO 5 STEPS WITH RAILING: A LOT
STANDING UP FROM CHAIR USING ARMS: A LITTLE
HELP NEEDED FOR BATHING: A LITTLE
TOILETING: A LITTLE
DRESSING REGULAR LOWER BODY CLOTHING: A LITTLE
TOILETING: A LITTLE
WALKING IN HOSPITAL ROOM: A LITTLE
MOBILITY SCORE: 18
HELP NEEDED FOR BATHING: A LITTLE
DAILY ACTIVITIY SCORE: 21
TURNING FROM BACK TO SIDE WHILE IN FLAT BAD: A LITTLE
MOVING TO AND FROM BED TO CHAIR: A LITTLE
STANDING UP FROM CHAIR USING ARMS: A LITTLE

## 2024-11-20 ASSESSMENT — LIFESTYLE VARIABLES
PRESCIPTION_ABUSE_PAST_12_MONTHS: NO
HOW OFTEN DO YOU HAVE A DRINK CONTAINING ALCOHOL: NEVER
AUDIT-C TOTAL SCORE: 0
SUBSTANCE_ABUSE_PAST_12_MONTHS: NO
HOW OFTEN DO YOU HAVE 6 OR MORE DRINKS ON ONE OCCASION: NEVER
AUDIT-C TOTAL SCORE: 0
HOW MANY STANDARD DRINKS CONTAINING ALCOHOL DO YOU HAVE ON A TYPICAL DAY: PATIENT DOES NOT DRINK
SKIP TO QUESTIONS 9-10: 1

## 2024-11-20 ASSESSMENT — PAIN DESCRIPTION - ORIENTATION
ORIENTATION: MID
ORIENTATION: MID

## 2024-11-20 ASSESSMENT — ACTIVITIES OF DAILY LIVING (ADL)
ADEQUATE_TO_COMPLETE_ADL: YES
HEARING - RIGHT EAR: FUNCTIONAL
BATHING: NEEDS ASSISTANCE
DRESSING YOURSELF: INDEPENDENT
GROOMING: INDEPENDENT
FEEDING YOURSELF: INDEPENDENT
WALKS IN HOME: NEEDS ASSISTANCE
PATIENT'S MEMORY ADEQUATE TO SAFELY COMPLETE DAILY ACTIVITIES?: YES
LACK_OF_TRANSPORTATION: NO
TOILETING: NEEDS ASSISTANCE
HEARING - LEFT EAR: FUNCTIONAL
JUDGMENT_ADEQUATE_SAFELY_COMPLETE_DAILY_ACTIVITIES: YES

## 2024-11-20 ASSESSMENT — PATIENT HEALTH QUESTIONNAIRE - PHQ9
1. LITTLE INTEREST OR PLEASURE IN DOING THINGS: NOT AT ALL
2. FEELING DOWN, DEPRESSED OR HOPELESS: NOT AT ALL
SUM OF ALL RESPONSES TO PHQ9 QUESTIONS 1 & 2: 0

## 2024-11-20 ASSESSMENT — PAIN - FUNCTIONAL ASSESSMENT
PAIN_FUNCTIONAL_ASSESSMENT: 0-10

## 2024-11-20 ASSESSMENT — PAIN DESCRIPTION - LOCATION
LOCATION: ABDOMEN
LOCATION: ABDOMEN

## 2024-11-20 ASSESSMENT — COLUMBIA-SUICIDE SEVERITY RATING SCALE - C-SSRS
1. IN THE PAST MONTH, HAVE YOU WISHED YOU WERE DEAD OR WISHED YOU COULD GO TO SLEEP AND NOT WAKE UP?: NO
2. HAVE YOU ACTUALLY HAD ANY THOUGHTS OF KILLING YOURSELF?: NO
6. HAVE YOU EVER DONE ANYTHING, STARTED TO DO ANYTHING, OR PREPARED TO DO ANYTHING TO END YOUR LIFE?: NO

## 2024-11-20 NOTE — ANESTHESIA PROCEDURE NOTES
Peripheral IV  Date/Time: 11/20/2024 7:55 AM      Placement  Needle size: 18 G  Laterality: right  Location: hand  Local anesthetic: none  Site prep: alcohol  Technique: anatomical landmarks  Attempts: 1

## 2024-11-20 NOTE — ANESTHESIA PROCEDURE NOTES
Airway  Date/Time: 11/20/2024 7:50 AM  Urgency: elective    Airway not difficult    Staffing  Performed: KRISTI   Authorized by: Jered Alfonso MD    Performed by: Nathalia King  Patient location during procedure: OR    Indications and Patient Condition  Indications for airway management: anesthesia  Spontaneous Ventilation: absent  Sedation level: deep  Preoxygenated: yes  Patient position: sniffing  Mask difficulty assessment: 1 - vent by mask    Final Airway Details  Final airway type: endotracheal airway      Successful airway: ETT  Cuffed: yes   Successful intubation technique: direct laryngoscopy  Facilitating devices/methods: intubating stylet  Endotracheal tube insertion site: oral  Blade: Angus  Blade size: #4  ETT size (mm): 7.5  Cormack-Lehane Classification: grade I - full view of glottis  Placement verified by: capnometry   Cuff volume (mL): 6  Measured from: lips  ETT to lips (cm): 23  Number of attempts at approach: 1  Number of other approaches attempted: 0    Additional Comments  Lips and teeth remain in preanesthetic condition. Head and neck positioned neutral following intubation.

## 2024-11-20 NOTE — ANESTHESIA PREPROCEDURE EVALUATION
Patient: Tom Velasquez    Procedure Information       Date/Time: 11/20/24 0730    Procedure: ROBOTIC ASSISTED PROSTATE RESECTION    Location: [facility] 05 / [facility]    Surgeons: Jose Cooper MD            Relevant Problems   Anesthesia   (-) Difficult intubation   (-) PONV (postoperative nausea and vomiting)      Cardiac   (+) Hyperlipidemia, unspecified   (+) Primary hypertension      GI   (+) Gastroesophageal reflux disease with esophagitis without hemorrhage      /Renal   (+) Benign prostatic hyperplasia with urinary retention   (+) Kidney stone on right side      HEENT   (+) Bilateral sensorineural hearing loss       Clinical information reviewed:   Tobacco  Allergies  Meds   Med Hx  Surg Hx   Fam Hx  Soc Hx        NPO Detail:  NPO/Void Status  Date of Last Liquid: 11/20/24  Time of Last Liquid: 0000  Date of Last Solid: 11/20/24  Time of Last Solid: 0000         Physical Exam    Airway  Mallampati: II  TM distance: >3 FB  Neck ROM: full     Cardiovascular - normal exam  Rhythm: regular  Rate: normal     Dental   (+) upper dentures, lower dentures     Pulmonary - normal exam     Abdominal            Anesthesia Plan    History of general anesthesia?: yes  History of complications of general anesthesia?: no    ASA 3     general     The patient is not a current smoker.  Education provided regarding risk of obstructive sleep apnea.  intravenous induction   Postoperative administration of opioids is intended.  Trial extubation is planned.  Anesthetic plan and risks discussed with patient.    Plan discussed with CRNA, CAA and attending.

## 2024-11-20 NOTE — OP NOTE
ROBOTIC ASSISTED PROSTATE RESECTION Operative Note     Date: 2024  OR Location: [facility]    Name: Tom Velasquez, : 1945, Age: 79 y.o., MRN: [id number], Sex: male    Diagnosis  Pre-op Diagnosis      * Benign prostatic hyperplasia with urinary retention [N40.1, R33.8] Post-op Diagnosis     * Benign prostatic hyperplasia with urinary retention [N40.1, R33.8]     Procedures  ROBOTIC ASSISTED PROSTATE RESECTION  32202 - ID LAPS SURG [id number] SMPL STOT ROBOTIC ASSISTANCE    Robotic-assisted laparoscopic simple prostatectomy    Surgeons      * Jose Cooper - Primary    Resident/Fellow/Other Assistant:  Surgeons and Role:     * Danielle Mattson MD - Resident - Assisting    Staff:   Circulator: Nilda  Surgical Assistant: Arash Willoughby Person: [place]  Relief Circulator: Urvashi  Circulator: Terrie Willoughby Person: Gutierrez Garcia Circulator: Gutierrez    Anesthesia Staff: Anesthesiologist: Jered Alfonso MD  CRNA: CRISTIANE Luu DNP  SRNA: Nathalia King    Procedure Summary  Anesthesia: General  ASA: III  Estimated Blood Loss: 1000mL  Intra-op Medications:   Administrations occurring from 0735 to 1225 on 24:   Medication Name Total Dose   sterile water irrigation solution 500 mL   ceFAZolin (Ancef) 2 g in dextrose (iso)  mL 2 g   albumin human 5 % 250 mL   albuterol inhaler 8 puff   ceFAZolin (Ancef) vial 1 g 2 g   dexAMETHasone (Decadron) injection 4 mg/mL 24 mg   dexmedeTOMIDine (Precedex) bolus from bag 32 mcg   fentaNYL (Sublimaze) injection 50 mcg/mL 150 mcg   labetalol (Normodyne,Trandate) injection 5 mg   LR infusion Cannot be calculated   lidocaine (cardiac) injection 2% prefilled syringe 80 mg   phenylephrine 100 mcg/mL syringe 10 mL (prefilled) 1,400 mcg   propofol (Diprivan) injection 10 mg/mL 250 mg   rocuronium (ZeMuron) 50 mg/5 mL injection 180 mg              Anesthesia Record               Intraprocedure I/O Totals          Intake    Transfuse RBC :1 Hour 350.00 mL     Dexmedetomidine 0.00 mL    The total shown is the total volume documented since Anesthesia Start was filed.    Total Intake 350 mL       Output    Est. Blood Loss 1000 mL    Total Output 1000 mL       Net    Net Volume -650 mL          Specimen:   ID Type Source Tests Collected by Time   1 : PROSTATE Tissue SOFT TISSUE RESECTION SURGICAL PATHOLOGY EXAM Jose Cooper MD 11/20/2024 0832                 Drains and/or Catheters:   Closed/Suction Drain 1 Right RUQ Bulb 15 Fr. (Active)       Urethral Catheter Non-latex 22 Fr. (Active)       [REMOVED] Urethral Catheter Non-latex 16 Fr. (Removed)     22F 3-way silicone Sanderson 30ml in balloon on mild traction with CBI running  15F Mathew drain in right upper quadrant    Findings:   Very large prostate adenoma, bisected in midline and removed in two samples  More bleeding than usual from capsule during dissection, controlled with flowseal and hemostatic at conclusion of case  Cystotomy closure water tight at 150ml, drain placed    Indications: Tom Velasquez is an 79 y.o. male who is having surgery for Benign prostatic hyperplasia with urinary retention [N40.1, R33.8].     The patient was seen in the preoperative area. The risks, benefits, complications, treatment options, non-operative alternatives, expected recovery and outcomes were discussed with the patient. The possibilities of reaction to medication, pulmonary aspiration, injury to surrounding structures, bleeding, recurrent infection, the need for additional procedures, failure to diagnose a condition, and creating a complication requiring transfusion or operation were discussed with the patient. The patient concurred with the proposed plan, giving informed consent.  The site of surgery was properly noted/marked if necessary per policy. The patient has been actively warmed in preoperative area. Preoperative antibiotics have been ordered and given within 1 hours of incision. Venous thrombosis prophylaxis have  been ordered including bilateral sequential compression devices    Procedure Details:   After informed consent, patient taken to the operating room.  General anesthesia was established, positioned supine, secured to the table, all pressure points are padded, prepped and draped.  OG placed.  Timeout was performed.  16F Sanderson catheter placed on the field.        Veress needle used to establish pneumoperitoneum at Slaughter's point in the left upper quadrant to 15mmhg.  Placed 8mm robotic trochar via supraumbilical midline incision for camera port.  Then placed 4 ports in line with inferior aspect of umbilicus.  12mm airseal right lateral, 8mm robotic at right medial, and left medial and lateral.  5mm assistant port triangulated superior to camera and right medial ports.     Bladder filled to 150ml.  Midline longitudinal incision made, bladder suctioned out.  BL Uos identified, orthotopic and displaced by prostate protruding into bladder.  Incised bladder mucosa superior to Uos taking great care to avoid injury.  Undermined mucosa and established plane behind adenoma at 6-o'clock.  Carried this plane largely through blunt dissection toward the apex, then laterally around the right and then left aspects.  Completed circumferential bladder neck mucosal incision.  Joined the plane around the median commissure in the anterior midline down to the apex.  Difficult to establish anterior plane.  Ensured all attachments released, then attempted to transect urethra however unable to visualize adequately given size and configuration of adenoma.  Bisected the adenoma in the midline along the anterior surface of the Sadnerson catheter.  Removed the right adenoma with some difficulty due to extensive attachments at the apex.  Removed the left adenoma in a similar fashion.  Both adenoma fragments placed in the abdomen.     A circumferential anastomotic stitch using 3-0 barbed monofilament suture was used to close the dead space between  bladder and urethra.  Great care taken not to obstruct Uos.  Instilled flowseal beneath the mucosa in the prostate capsule.  Final 22F 3-way Sanderson placed, 10ml in balloon.     Cystotomy closed in two layers with 2-0 barbed monofilament suture.  Water tight at 150ml.  Placed a 15F Mathew drain.  Placed adenoma in endocatch bag.  Robot undocked after confirming intraperitoneal hemostasis.  Gerald-berkley device used to close fascia around 12mm airseal port with 0 vicryl.  Abdomen desufflated and ports removed.     Adenoma extracted by extending the supraumbilical midline incision to about 6cm.  Fascia closed with running 0 PDS.  Deep dermal 3-0 vicryl followed by 4-0 monocryl for skin closure.  All ports closed.  0.5% marcaine instilled around incisions.  Dermabond applied for dressing.     Sanderson irrigated, increased balloon to 30ml and placed on mild traction.  CBI running with pink return.    This concluded the procedure which the patient tolerated well.  Patient was cleaned, dried and awakened from anesthesia and transferred to PACU in excellent condition.     Plan:  Observe overnight on CBI  Labs in PACU then AM including MICHELLE Cr - remove if serous  Maintain Sanderson until follow up  Follow up 12/2 for Sanderson removal, 3 months for flow/pvr     Complications:  None; patient tolerated the procedure well.    Disposition: PACU - hemodynamically stable.  Condition: stable         Task Performed by RNFA or Surgical Assistant:  Lap port placement, lap suction/irrigation and retraction, skin closure          Additional Details: none    Attending Attestation: I was present and scrubbed for the entire procedure.    Jose Cooper  Phone Number: [phone]

## 2024-11-20 NOTE — ANESTHESIA PROCEDURE NOTES
Arterial Line:    Date/Time: 11/20/2024 7:58 AM    Staffing  Performed: KRISTI   Authorized by: Jered Alfonso MD    Performed by: Nathalia King    An arterial line was placed. Procedure performed using surface landmarks.in the ICU for the following indication(s): continuous blood pressure monitoring and blood sampling needed.    A 20 gauge (size), 1 and 3/4 inch (length), Angiocath (type) catheter was placed into the Left radial artery, secured by Tegaderm,   Seldinger technique used.  Events:  patient tolerated procedure well with no complications.

## 2024-11-21 ENCOUNTER — HOME HEALTH ADMISSION (OUTPATIENT)
Dept: HOME HEALTH SERVICES | Facility: HOME HEALTH | Age: 79
End: 2024-11-21
Payer: MEDICARE

## 2024-11-21 ENCOUNTER — DOCUMENTATION (OUTPATIENT)
Dept: HOME HEALTH SERVICES | Facility: HOME HEALTH | Age: 79
End: 2024-11-21
Payer: COMMERCIAL

## 2024-11-21 ENCOUNTER — PHARMACY VISIT (OUTPATIENT)
Dept: PHARMACY | Facility: CLINIC | Age: 79
End: 2024-11-21
Payer: MEDICARE

## 2024-11-21 VITALS
BODY MASS INDEX: 29.16 KG/M2 | HEIGHT: 65 IN | WEIGHT: 175 LBS | OXYGEN SATURATION: 96 % | SYSTOLIC BLOOD PRESSURE: 120 MMHG | TEMPERATURE: 97 F | DIASTOLIC BLOOD PRESSURE: 57 MMHG | RESPIRATION RATE: 20 BRPM | HEART RATE: 92 BPM

## 2024-11-21 PROBLEM — N40.1 BENIGN PROSTATIC HYPERPLASIA (BPH) WITH URINARY URGENCY: Status: ACTIVE | Noted: 2024-11-21

## 2024-11-21 PROBLEM — R39.15 BENIGN PROSTATIC HYPERPLASIA (BPH) WITH URINARY URGENCY: Status: ACTIVE | Noted: 2024-11-21

## 2024-11-21 LAB
ANION GAP SERPL CALC-SCNC: 10 MMOL/L (ref 10–20)
BUN SERPL-MCNC: 25 MG/DL (ref 6–23)
CALCIUM SERPL-MCNC: 8.2 MG/DL (ref 8.6–10.3)
CHLORIDE SERPL-SCNC: 104 MMOL/L (ref 98–107)
CO2 SERPL-SCNC: 26 MMOL/L (ref 21–32)
CREAT FLD-MCNC: 0.89 MG/DL
CREAT SERPL-MCNC: 0.86 MG/DL (ref 0.5–1.3)
EGFRCR SERPLBLD CKD-EPI 2021: 88 ML/MIN/1.73M*2
ERYTHROCYTE [DISTWIDTH] IN BLOOD BY AUTOMATED COUNT: 12.9 % (ref 11.5–14.5)
GLUCOSE SERPL-MCNC: 137 MG/DL (ref 74–99)
HCT VFR BLD AUTO: 31.2 % (ref 41–52)
HGB BLD-MCNC: 10.4 G/DL (ref 13.5–17.5)
MCH RBC QN AUTO: 30.2 PG (ref 26–34)
MCHC RBC AUTO-ENTMCNC: 33.3 G/DL (ref 32–36)
MCV RBC AUTO: 91 FL (ref 80–100)
NRBC BLD-RTO: 0 /100 WBCS (ref 0–0)
PLATELET # BLD AUTO: 260 X10*3/UL (ref 150–450)
POTASSIUM SERPL-SCNC: 4.7 MMOL/L (ref 3.5–5.3)
RBC # BLD AUTO: 3.44 X10*6/UL (ref 4.5–5.9)
SODIUM SERPL-SCNC: 135 MMOL/L (ref 136–145)
WBC # BLD AUTO: 13.9 X10*3/UL (ref 4.4–11.3)

## 2024-11-21 PROCEDURE — 7100000011 HC EXTENDED STAY RECOVERY HOURLY - NURSING UNIT

## 2024-11-21 PROCEDURE — 99238 HOSP IP/OBS DSCHRG MGMT 30/<: CPT | Performed by: NURSE PRACTITIONER

## 2024-11-21 PROCEDURE — 2500000001 HC RX 250 WO HCPCS SELF ADMINISTERED DRUGS (ALT 637 FOR MEDICARE OP): Performed by: STUDENT IN AN ORGANIZED HEALTH CARE EDUCATION/TRAINING PROGRAM

## 2024-11-21 PROCEDURE — 2500000002 HC RX 250 W HCPCS SELF ADMINISTERED DRUGS (ALT 637 FOR MEDICARE OP, ALT 636 FOR OP/ED): Performed by: STUDENT IN AN ORGANIZED HEALTH CARE EDUCATION/TRAINING PROGRAM

## 2024-11-21 PROCEDURE — RXMED WILLOW AMBULATORY MEDICATION CHARGE

## 2024-11-21 PROCEDURE — 96372 THER/PROPH/DIAG INJ SC/IM: CPT | Performed by: STUDENT IN AN ORGANIZED HEALTH CARE EDUCATION/TRAINING PROGRAM

## 2024-11-21 PROCEDURE — 2500000004 HC RX 250 GENERAL PHARMACY W/ HCPCS (ALT 636 FOR OP/ED)

## 2024-11-21 PROCEDURE — 82570 ASSAY OF URINE CREATININE: CPT | Mod: PARLAB | Performed by: NURSE PRACTITIONER

## 2024-11-21 PROCEDURE — G0378 HOSPITAL OBSERVATION PER HR: HCPCS

## 2024-11-21 PROCEDURE — 85027 COMPLETE CBC AUTOMATED: CPT | Performed by: STUDENT IN AN ORGANIZED HEALTH CARE EDUCATION/TRAINING PROGRAM

## 2024-11-21 PROCEDURE — 80051 ELECTROLYTE PANEL: CPT | Performed by: STUDENT IN AN ORGANIZED HEALTH CARE EDUCATION/TRAINING PROGRAM

## 2024-11-21 PROCEDURE — 99024 POSTOP FOLLOW-UP VISIT: CPT | Performed by: STUDENT IN AN ORGANIZED HEALTH CARE EDUCATION/TRAINING PROGRAM

## 2024-11-21 PROCEDURE — 36415 COLL VENOUS BLD VENIPUNCTURE: CPT | Performed by: STUDENT IN AN ORGANIZED HEALTH CARE EDUCATION/TRAINING PROGRAM

## 2024-11-21 PROCEDURE — 97161 PT EVAL LOW COMPLEX 20 MIN: CPT | Mod: GP

## 2024-11-21 PROCEDURE — 2500000004 HC RX 250 GENERAL PHARMACY W/ HCPCS (ALT 636 FOR OP/ED): Performed by: STUDENT IN AN ORGANIZED HEALTH CARE EDUCATION/TRAINING PROGRAM

## 2024-11-21 RX ORDER — POLYETHYLENE GLYCOL 3350 17 G/17G
17 POWDER, FOR SOLUTION ORAL DAILY
Qty: 7 PACKET | Refills: 0 | Status: SHIPPED | OUTPATIENT
Start: 2024-11-22 | End: 2024-11-29

## 2024-11-21 RX ORDER — HEPARIN SODIUM 5000 [USP'U]/ML
5000 INJECTION, SOLUTION INTRAVENOUS; SUBCUTANEOUS EVERY 8 HOURS
Status: DISCONTINUED | OUTPATIENT
Start: 2024-11-21 | End: 2024-11-21 | Stop reason: HOSPADM

## 2024-11-21 RX ORDER — CEFTRIAXONE 1 G/50ML
1 INJECTION, SOLUTION INTRAVENOUS ONCE
Status: COMPLETED | OUTPATIENT
Start: 2024-11-21 | End: 2024-11-21

## 2024-11-21 RX ORDER — IBUPROFEN 600 MG/1
600 TABLET ORAL EVERY 6 HOURS PRN
Qty: 56 TABLET | Refills: 0 | Status: SHIPPED | OUTPATIENT
Start: 2024-11-21 | End: 2024-12-05

## 2024-11-21 RX ORDER — ACETAMINOPHEN 325 MG/1
650 TABLET ORAL EVERY 6 HOURS PRN
Qty: 112 TABLET | Refills: 0 | Status: SHIPPED | OUTPATIENT
Start: 2024-11-21 | End: 2024-12-05

## 2024-11-21 RX ORDER — NITROFURANTOIN 25; 75 MG/1; MG/1
100 CAPSULE ORAL 2 TIMES DAILY
Qty: 24 CAPSULE | Refills: 0 | Status: SHIPPED | OUTPATIENT
Start: 2024-11-21 | End: 2024-12-03

## 2024-11-21 RX ADMIN — PANTOPRAZOLE SODIUM 40 MG: 40 TABLET, DELAYED RELEASE ORAL at 08:05

## 2024-11-21 RX ADMIN — AMANTADINE 100 MG: 100 CAPSULE ORAL at 08:05

## 2024-11-21 RX ADMIN — ACETAMINOPHEN 650 MG: 325 TABLET, FILM COATED ORAL at 14:19

## 2024-11-21 RX ADMIN — CEFTRIAXONE SODIUM 1 G: 1 INJECTION, SOLUTION INTRAVENOUS at 14:18

## 2024-11-21 RX ADMIN — TAMSULOSIN HYDROCHLORIDE 0.4 MG: 0.4 CAPSULE ORAL at 06:18

## 2024-11-21 RX ADMIN — POLYETHYLENE GLYCOL 3350 17 G: 17 POWDER, FOR SOLUTION ORAL at 08:05

## 2024-11-21 RX ADMIN — CARBIDOPA AND LEVODOPA 1.5 TABLET: 25; 100 TABLET ORAL at 08:05

## 2024-11-21 RX ADMIN — HEPARIN SODIUM 5000 UNITS: 5000 INJECTION INTRAVENOUS; SUBCUTANEOUS at 13:13

## 2024-11-21 RX ADMIN — CARBIDOPA AND LEVODOPA 1.5 TABLET: 25; 100 TABLET ORAL at 14:19

## 2024-11-21 RX ADMIN — SODIUM CHLORIDE 75 ML/HR: 9 INJECTION, SOLUTION INTRAVENOUS at 08:04

## 2024-11-21 RX ADMIN — ACETAMINOPHEN 650 MG: 325 TABLET, FILM COATED ORAL at 08:05

## 2024-11-21 ASSESSMENT — PAIN - FUNCTIONAL ASSESSMENT
PAIN_FUNCTIONAL_ASSESSMENT: 0-10
PAIN_FUNCTIONAL_ASSESSMENT: 0-10

## 2024-11-21 ASSESSMENT — COGNITIVE AND FUNCTIONAL STATUS - GENERAL
CLIMB 3 TO 5 STEPS WITH RAILING: A LITTLE
MOBILITY SCORE: 23

## 2024-11-21 ASSESSMENT — PAIN SCALES - GENERAL
PAINLEVEL_OUTOF10: 0 - NO PAIN
PAINLEVEL_OUTOF10: 5 - MODERATE PAIN
PAINLEVEL_OUTOF10: 5 - MODERATE PAIN

## 2024-11-21 NOTE — HH CARE COORDINATION
Home Care received a Referral for Physical Therapy. We have processed the referral for a Start of Care on 11/22-11/23.     If you have any questions or concerns, please feel free to contact us at [phone]. Follow the prompts, enter your five digit zip code, and you will be directed to your care team on [facility] 3.

## 2024-11-21 NOTE — PROGRESS NOTES
11/21/24 1432   Discharge Planning   Living Arrangements Spouse/significant other   Support Systems Spouse/significant other;Family members   Assistance Needed independent at [facility]   Type of Residence Private residence   Number of Stairs to Enter Residence 2   Expected Discharge Disposition Home H   Does the patient need discharge transport arranged? No     I met with patient and his wife at the bedside, verified insurance and demographics.  Patient did not use mobility aids prior to admission but is going home with a walker.  He denies falls, drives and is independent with ADLs at baseline.  Patient discharging with a Sanderson and is agreeable to [facility].  They declined a list of agencies and verbalized preference for [facility]; referral sent.  ADOD today.  Gustavo RICKS [facility]

## 2024-11-21 NOTE — PROGRESS NOTES
Physical Therapy    Physical Therapy Evaluation    Patient Name: Tom Velasquez  MRN: [id number]  Today's Date: 11/21/2024   Time Calculation  Start Time: 1356  Stop Time: 1409  Time Calculation (min): 13 min  614/614-A    Assessment/Plan   PT Assessment  PT Assessment Results: Decreased strength, Decreased endurance, Impaired balance, Decreased mobility, Pain  Rehab Prognosis: Good  Evaluation/Treatment Tolerance: Patient limited by fatigue  End of Session Communication: Bedside nurse, Physician  Assessment Comment: Pt is s/p prostate resectomy. Pt presents with decreased endurance and increased abdominal pain. Pt is close to baseline LOF and is safe to return home with [facility] PT and use of FWW.  End of Session Patient Position: Up in chair  IP OR SWING BED PT PLAN  Inpatient or Swing Bed: Inpatient  PT Plan  PT Plan: PT Eval only  PT Eval Only Reason: Safe to return home  PT Frequency: PT eval only  PT Discharge Recommendations: Low intensity level of continued care  PT - OK to Discharge: Yes (once medically cleared)    Subjective     Current Problem:  1. Benign prostatic hyperplasia with urinary retention  Surgical Pathology Exam    Surgical Pathology Exam      2. History of robot-assisted laparoscopic radical prostatectomy  acetaminophen (Tylenol) 325 mg tablet    ibuprofen 600 mg tablet    Catheter Care    Referral to Home Health      3. Postoperative pain  acetaminophen (Tylenol) 325 mg tablet    ibuprofen 600 mg tablet      4. Constipation, unspecified constipation type  polyethylene glycol (Glycolax, Miralax) 17 gram packet      5. Acute cystitis without hematuria  nitrofurantoin, macrocrystal-monohydrate, (Macrobid) 100 mg capsule        Patient Active Problem List   Diagnosis    Idiopathic Parkinson's disease (Multi)    Screening PSA (prostate specific antigen)    Gastroesophageal reflux disease with esophagitis without hemorrhage    Vitamin D deficiency    Malaise and fatigue    Dyslipidemia    Primary  hypertension    Elevated PSA    Overactive bladder    Hyperlipidemia, unspecified    Bilateral sensorineural hearing loss    Impacted cerumen of right ear    Nerve pain    Right inguinal hernia    Sensation of plugged ear on right side    Shoulder pain    Urinary retention    Kidney stone on right side    Hospital discharge follow-up    Right ureteral calculus    Benign prostatic hyperplasia with urinary retention    History of robot-assisted laparoscopic radical prostatectomy    Benign prostatic hyperplasia (BPH) with urinary urgency       General Visit Information:  General  Reason for Referral: PT eval and treat; impaired mobility  Referred By: BIN Ruiz-CNP  Past Medical History Relevant to Rehab: Pt admitted 11/20 for prostate resection due to BPH. Pt with gallegos in place until 12/2. (PMH: BPH, R inguinal hernia, Parkinson's)  Family/Caregiver Present: Yes  Caregiver Feedback: wife  Patient Position Received: Up in chair  General Comment: Pt pleasant and agreeable to therapy    Home Living:  Home Living  Home Living Comments: Pt lives at home with his wife in a single level home with 2STE. Tub/shower with no bar. Standard toilet. No device at home. Ind with ADLs and drives. Retired.    Prior Level of Function:  Prior Function Per Pt/Caregiver Report  Level of [place]: Independent with ADLs and functional transfers, Independent with homemaking with ambulation    Precautions:  Precautions  Medical Precautions: Fall precautions  Precautions Comment: rosy     Objective     Pain:  Pain Assessment  Pain Assessment: 0-10  0-10 (Numeric) Pain Score: 5 - Moderate pain  Pain Location: Abdomen  Pain Interventions: Repositioned, Ambulation/increased activity  Response to Interventions: No change in pain    Cognition:  Cognition  Overall Cognitive Status: Within Functional Limits  Orientation Level: Oriented X4    General Assessments:      Activity Tolerance  Endurance: Decreased tolerance for upright  activites  Sensation  Light Touch: No apparent deficits  Strength  Strength Comments: BLE WFL     Functional Assessments:     Bed Mobility  Bed Mobility:  (N/A pt in chair pre/post session)  Transfers  Transfer:  (from EOB to FWW with SBA. Demos slight unsteadiness and tremor in RUE)  Ambulation/Gait Training  Ambulation/Gait Training Performed:  (Pt completed ~200'x1 with use of FWW and SBA. Denies dizziness. Slow shuffling steps that improved with increased distance.)     Extremity/Trunk Assessments:        RLE   RLE : Within Functional Limits  LLE   LLE : Within Functional Limits    Outcome Measures:     [facility] Basic Mobility  Turning from your back to your side while in a flat bed without using bedrails: None  Moving from lying on your back to sitting on the side of a flat bed without using bedrails: None  Moving to and from bed to chair (including a wheelchair): None  Standing up from a chair using your arms (e.g. wheelchair or bedside chair): None  To walk in hospital room: None  Climbing 3-5 steps with railing: A little  Basic Mobility - Total Score: 23     Goals:  Eval only     Education Documentation  Mobility Training, taught by Leticia Amaya, PT at 11/21/2024  3:35 PM.  Learner: Patient  Readiness: Acceptance  Method: Explanation  Response: Verbalizes Understanding    Education Comments  No comments found.

## 2024-11-21 NOTE — ANESTHESIA POSTPROCEDURE EVALUATION
Patient: Tom Velasquez    Procedure Summary       Date: 11/20/24 Room / Location: [facility] OR  / Virtual [facility] OR    Anesthesia Start: 0742 Anesthesia Stop: 1438    Procedure: ROBOTIC ASSISTED PROSTATE RESECTION Diagnosis:       Benign prostatic hyperplasia with urinary retention      (Benign prostatic hyperplasia with urinary retention [N40.1, R33.8])    Surgeons: Jose Cooper MD Responsible Provider: Jered Alfonso MD    Anesthesia Type: general ASA Status: 3            Anesthesia Type: general    Vitals Value Taken Time   /77 11/20/24 1700   Temp 36.4 °C (97.5 °F) 11/20/24 1437   Pulse 86 11/20/24 1706   Resp 18 11/20/24 1700   SpO2 98 % 11/20/24 1706   Vitals shown include unfiled device data.    Anesthesia Post Evaluation    Patient location during evaluation: PACU  Patient participation: complete - patient participated  Level of consciousness: awake and alert  Pain management: satisfactory to patient  Airway patency: patent  Cardiovascular status: acceptable  Respiratory status: acceptable  Hydration status: acceptable  Postoperative Nausea and Vomiting: none        No notable events documented.

## 2024-11-21 NOTE — CARE PLAN
The patient's goals for the shift include      The clinical goals for the shift include pt to remain HDS and safe during this shift    Over the shift, the patient did not make progress toward the following goals. Barriers to progression include n/a. Recommendations to address these barriers include n/a.

## 2024-11-21 NOTE — DISCHARGE SUMMARY
Discharge Diagnosis  Benign prostatic hyperplasia with urinary retention    Issues Requiring Follow-Up  Gallegos catheter removal, Post-op visit     Test Results Pending At Discharge  Pending Labs       Order Current Status    Surgical Pathology Exam In process            Hospital Course   78 yo male with PMH of parkinson's disease, BPH and urinary retention s/p robotic simple prostatectomy with Dr. Cooper on 11/20. The procedure was well tolerated and has had an uncomplicated surgical course. Today, MICHELLE drain was removed. Perioperative antibiotics were continued until discharge. At the time of hospital discharge he was tolerating a regular diet, ambulating, passing flatus, and pain was well-controlled. The patient and wife worked with the urology team to learn wound and gallegos catheter care. He has a supportive home environment, is independent with ADLs, and believes he and his wife can manage care at home. Ciprofloxacin was discontinued due to patient reporting bilateral calf pain when taking this. He will be started on Macrobid 100mg BID x 12 days per urine culture sensitivity. Patient was found to have impaired mobility with PT eval recommending home health care services and walker was provided for patient to take home. He was discharged in satisfactory condition and will follow up with outpatient urology.      Pertinent Physical Exam At Time of Discharge  Physical Exam  Pulmonary:      Effort: Pulmonary effort is normal.   Abdominal:      Tenderness: There is abdominal tenderness.      Comments: RUQ MICHELLE drain with serosanguineous drainage, this was removed at bedside intact. Laparoscopic incisions are well-approximated, clean and dry with Dermabond intact. Abdomen is appropriately tender with no peritonitic signs.    Genitourinary:     Comments: Gallegos catheter intact with pink tinged urine output. Minimal bloody drainage around urethral meatus. Bilateral scrotal edema.   Neurological:      Mental Status: He is  alert and oriented to person, place, and time.         Home Medications     Medication List      START taking these medications     acetaminophen 325 mg tablet; Commonly known as: Tylenol; Take 2 tablets   (650 mg) by mouth every 6 hours if needed for mild pain (1 - 3) for up to   14 days.   ibuprofen 600 mg tablet; Take 1 tablet (600 mg) by mouth every 6 hours   if needed for mild pain (1 - 3) for up to 14 days.   nitrofurantoin 50 mg capsule; Commonly known as: Macrodantin; Take 2   capsules (100 mg) by mouth 2 times a day for 12 days.   polyethylene glycol 17 gram packet; Commonly known as: Glycolax,   Miralax; Mix 1 packet (17 g) in 4 to 8 ounces of a liquid and drink by   mouth once daily for 7 days as directed; Start taking on: November 22, 2024     CONTINUE taking these medications     amantadine 100 mg capsule; Commonly known as: Symmetrel   atorvastatin 20 mg tablet; Commonly known as: Lipitor; TAKE 1 TABLET BY   MOUTH EVERY DAY   carbidopa-levodopa  mg tablet; Commonly known as: Sinemet   pantoprazole 40 mg EC tablet; Commonly known as: ProtoNix; TAKE 1 TABLET   (40 MG) BY MOUTH DAILY DO NOT CRUSH, CHEW, ORSPLIT   tamsulosin 0.4 mg 24 hr capsule; Commonly known as: Flomax; Take 1   capsule (0.4 mg) by mouth every 12 hours.     STOP taking these medications     ciprofloxacin 500 mg tablet; Commonly known as: Cipro       Outpatient Follow-Up  Future Appointments   Date Time Provider Department Center   12/2/2024  9:30 AM Jose Cooper MD [id number] None   1/15/2025  9:30 AM DO [id number]   2/20/2025  9:30 AM Jose Cooper MD [id number] None   8/27/2025 10:40 AM Chente Whaley MD [id number] [facility]       Shannan Segura, BIN-CNP

## 2024-11-21 NOTE — DISCHARGE INSTRUCTIONS
Urology [place]    DISCHARGE INSTRUCTIONS     Prostatectomy    Tom Robertjose Cooper's contact information:  - Nurse line (clinical questions):  [phone]  - Admin line (scheduling questions):  [phone]      If it is a life-threatening situation, proceed to the nearest emergency department.        Follow-up appointment:  12/2/24      Thank you for the opportunity to care for you today.  Your health and healing are very important to us.  We hope we made you feel as comfortable as possible and are committed to your recovery and continued well-being.      The following is a brief overview of your prostatectomy procedure. Some of the information contained on this summary may be confidential.  This information should be kept in your records and should be shared with your regular doctor.    Physicians:   Dr. Jose Cooper    Procedure performed: removal of your prostate      What to Expect During your Recovery and Home Care  Anesthesia Side Effects   You may feel sleepy, tired, or have a sore throat.   You may also feel drowsiness, dizziness, or inability to think clearly.  For your safety, do not drive, drink alcoholic beverages, take any unprescribed medication or make any important decisions for 24 hours after anesthesia.  A responsible adult should be with you for 24 hours.        Activity and Recovery    No heavy lifting greater than 10 pounds for the next 4-6 weeks. No driving until mobility has returned to normal. Do not drive or operate heavy machinery while taking narcotic pain medications as these medications can alter perception, impair judgement, and slow reaction times.  Pain Control  Unfortunately, you may experience pain after your procedure. Adequate pain management can include alternative measures to help ease your pain and that can include over the counter Tylenol/ibuprofen. Do not take more than 4,000mg of Tylenol in a 24-hour period.      Your procedure was done robotically  so you may experience gas pains from the surgery. Getting up and moving around is the best way to relieve those pains. You can also take some over the counter gas-x(simethicone).    Nausea/Vomiting   Start slow, advance your diet as tolerated to normal foods. Avoid spicy, greasy, heavy foods at first. Also, you may feel nauseous or like you need to vomit if you take any type of medication on an empty stomach.  Call your physician if you are unable to eat or drink, are not passing gas and have persistent vomiting.    Signs of Bleeding   You could have some blood in your urine off and on over the next several weeks. Your urine will be light pink to yellow. Minor bleeding or drainage may occur from the surgical sites; however, excessive or consistent bleeding should be reported to your surgeon. Excessive bleeding is defined as blood that is dripping from wound, soaking you bandages, and is ketchup colored, thick with possible blood clots.  Consistent is defined as bleeding that does not stop.      Treatment/wound care:   Keep area(s) clean and dry.   It is okay to shower 24 hours after time of surgery.    Do not scrub wound(s), pat dry.    Do not submerge wound(s) in standing water until seen for follow up appointment (no tub bathing, swimming, or hot tubs).    Clean with mild soap, gentle washing, pat dry, cover with bandage as needed.    Avoid waterproof bandages.  No oils or lotions on incisions.  Staples/sutures removed in our [id number] days after the date of surgery.  Do not remove the staples/sutures on your own.    Please visually inspect your wound(s) at least once daily.  If the wound(s) are in a difficult to see location, please use a mirror or have someone else assist with visual inspection.    Signs of Infection  Signs of infection can include fever, chills, redness around surgical incisions, green/yellow drainage from incisions, or severe abdominal pain.  If you see any of these occur, please contact  your doctor's office.  Any fever higher than 100.4, especially if associated with an ill feeling, abdominal pain, chills, or nausea should be reported to your surgeon.      Assist in bowel movements/urination  Take daily stool softener you were prescribed  Increase fiber in diet  Increase water (6 to 8 glasses)  Increase walking   Can try adding over the counter MiraLAX or in extreme cases milk of magnesia.  If you have tried these methods and you are not passing gas, your bladder still feels full and you cannot have a bowel movement, please go to your nearest Emergency room/contact your physician.    Additional Instructions:   Use a small pillow to put pressure on your belly. This can make you more comfortable when you cough, laugh or do other actions.     CATHETER CARE  Always keep the catheters tubing and drainage bag below the level of your bladder.  Avoid loops and kinks in the catheter tubing.  NOTIFY your physician if catheter falls out or catheter seems clogged and urine is not draining.   Do not wear the small leg bag to bed you should be provided with a larger overnight bag that you should wear to bed and can hang over the side of the bed.  We recommend wearing the large bag in the shower as well as this is easy to dry, and you do not get your leg straps wet from your leg bag.   Your catheter should be secured to your upper thigh, do not allow it to hang or dangle.

## 2024-11-21 NOTE — PROGRESS NOTES
"Tom Velasquez is a 79 y.o. male on day 0 of admission presenting with Benign prostatic hyperplasia with urinary retention.    Subjective   No acute events.  Pain controlled.  Not yet OOB.  Flatus x1.  Urine clear on slow drip CBI, stopped this morning.  MICHELLE 45cc.  Hg 10.4 from 11.6.  Cr 0.86.  MICHELLE Cr 0.89.       Objective     Physical Exam  General:  No acute distress  HEENT:  EOMI  CV:  Regular rate  Pulm:  Nonlabored respirations  Abd:  Soft, mildly distended, incisions CDI, MICHELLE serosang  :  22F 3-way Sanderson draining thin pink urine several hours after CBI paused  MSK:  No contractures  Neuro:  Motor intact  Psych:  Appropriate affect    Last Recorded Vitals  Blood pressure 137/61, pulse 98, temperature 37.5 °C (99.5 °F), temperature source Temporal, resp. rate 18, height 1.651 m (5' 5\"), weight 79.4 kg (175 lb), SpO2 94%.  Intake/Output last 3 Shifts:  I/O last 3 completed shifts:  In: 386.3 (4.9 mL/kg) [I.V.:36.3 (0.5 mL/kg); Blood:350]  Out: 19765 (191.6 mL/kg) [Urine:68155 (4.9 mL/kg/hr); Drains:110; Blood:1000]  Weight: 79.4 kg     Relevant Results  Scheduled medications  acetaminophen, 650 mg, oral, q6h  amantadine, 100 mg, oral, Daily  atorvastatin, 20 mg, oral, Daily  carbidopa-levodopa, 1.5 tablet, oral, TID  cefTRIAXone, 1 g, intravenous, Once  heparin (porcine), 5,000 Units, subcutaneous, q8h  pantoprazole, 40 mg, oral, Daily  polyethylene glycol, 17 g, oral, Daily  tamsulosin, 0.4 mg, oral, q12h MARISSA      Continuous medications     PRN medications  PRN medications: ketorolac, ondansetron    Results for orders placed or performed during the hospital encounter of 11/20/24 (from the past 24 hours)   CBC   Result Value Ref Range    WBC 25.5 (H) 4.4 - 11.3 x10*3/uL    nRBC 0.0 0.0 - 0.0 /100 WBCs    RBC 3.91 (L) 4.50 - 5.90 x10*6/uL    Hemoglobin 11.6 (L) 13.5 - 17.5 g/dL    Hematocrit 34.9 (L) 41.0 - 52.0 %    MCV 89 80 - 100 fL    MCH 29.7 26.0 - 34.0 pg    MCHC 33.2 32.0 - 36.0 g/dL    RDW 12.7 11.5 - " 14.5 %    Platelets 254 150 - 450 x10*3/uL   Basic metabolic panel   Result Value Ref Range    Glucose 182 (H) 74 - 99 mg/dL    Sodium 134 (L) 136 - 145 mmol/L    Potassium 4.5 3.5 - 5.3 mmol/L    Chloride 103 98 - 107 mmol/L    Bicarbonate 24 21 - 32 mmol/L    Anion Gap 12 10 - 20 mmol/L    Urea Nitrogen 24 (H) 6 - 23 mg/dL    Creatinine 1.05 0.50 - 1.30 mg/dL    eGFR 72 >60 mL/min/1.73m*2    Calcium 8.3 (L) 8.6 - 10.3 mg/dL   CBC   Result Value Ref Range    WBC 13.9 (H) 4.4 - 11.3 x10*3/uL    nRBC 0.0 0.0 - 0.0 /100 WBCs    RBC 3.44 (L) 4.50 - 5.90 x10*6/uL    Hemoglobin 10.4 (L) 13.5 - 17.5 g/dL    Hematocrit 31.2 (L) 41.0 - 52.0 %    MCV 91 80 - 100 fL    MCH 30.2 26.0 - 34.0 pg    MCHC 33.3 32.0 - 36.0 g/dL    RDW 12.9 11.5 - 14.5 %    Platelets 260 150 - 450 x10*3/uL   Basic metabolic panel   Result Value Ref Range    Glucose 137 (H) 74 - 99 mg/dL    Sodium 135 (L) 136 - 145 mmol/L    Potassium 4.7 3.5 - 5.3 mmol/L    Chloride 104 98 - 107 mmol/L    Bicarbonate 26 21 - 32 mmol/L    Anion Gap 10 10 - 20 mmol/L    Urea Nitrogen 25 (H) 6 - 23 mg/dL    Creatinine 0.86 0.50 - 1.30 mg/dL    eGFR 88 >60 mL/min/1.73m*2    Calcium 8.2 (L) 8.6 - 10.3 mg/dL   Creatinine, Fluid   Result Value Ref Range    Creatinine,  Fluid 0.89 Not established mg/dL       CT abdomen pelvis wo IV contrast    Result Date: 11/6/2024  Interpreted By:  Mike Mcnulty, STUDY: CT ABDOMEN PELVIS WO IV CONTRAST;  11/4/2024 8:09 am   INDICATION: Signs/Symptoms:differentiate bladder stone from uvj stone.   COMPARISON: 10/07/2024   ACCESSION NUMBER(S): [id number]   ORDERING CLINICIAN: MODESTA INIGUEZ   TECHNIQUE: CT of the abdomen and pelvis was performed.  Contiguous axial images were obtained at 3 mm slice thickness through the abdomen and pelvis. Coronal and sagittal reconstructions at 3 mm slice thickness were performed. The patient received no intravenous contrast. Please note evaluation of the solid organs and vessels is limited in the  absence of intravenous contrast. Given this caveat, the following observations are made:   FINDINGS: LOWER CHEST: Mild bibasilar atelectasis.   ABDOMEN: Motion degraded exam.     LIVER: Unremarkable   BILE DUCTS: Not dilated.   GALLBLADDER: No calcified stone or definite inflammation.   PANCREAS: Unremarkable   SPLEEN: Dystrophic calcifications of the spleen are again noted.   ADRENAL GLANDS: Unremarkable   KIDNEYS AND URETERS: Previous right hydronephrosis appears to have resolved. Large left extrarenal pelvis versus chronic partial UPJ obstruction is again noted. Small cortical cyst in the left kidney. No upper tract calculi are identified.   PELVIS:   BLADDER: Decompressed with a Sanderson catheter. The previous 8 mm calculus near the right ureterovesical junction has shifted toward the midline dependently.   REPRODUCTIVE ORGANS: Severe prostatomegaly.   BOWEL: No findings of bowel obstruction or inflammation.   Appendix not identified. Unremarkable mesentery.   VESSELS: No abdominal aortic aneurysm.Severe atherosclerosis.   PERITONEUM AND RETROPERITONEUM: No free fluid or free air. No abdominal or pelvic lymphadenopathy.   BONE AND ABDOMINAL WALL: No acute skeletal findings.   Degenerative changes of the spine.           1.  8 mm calculus layering dependently in the urinary bladder, previously near the right ureterovesical junction. Previous right hydronephrosis has resolved. 2. Left extrarenal pelvis versus chronic partial UPJ obstruction appears similar to prior exam.   MACRO: None.   Signed by: Mike Mcnulty 11/6/2024 10:46 AM Dictation workstation:   [id number]                             Assessment/Plan   Assessment & Plan  Benign prostatic hyperplasia with urinary retention    History of robot-assisted laparoscopic radical prostatectomy    Benign prostatic hyperplasia (BPH) with urinary urgency    79M h/o Parkinson disease POD 1 s/p robotic simple prostatectomy 126g doing well.    - Need to ambulate before  discharge  - Keep Sanderson until outpatient follow up 12/2  - Dc on PO abx until 24h after Sanderson removed - plan Macrobid BID x12d  - Ceftriaxone dt CBI and pos ucx  - Can start heparin ppx, continue SCDs  - ISB  - Regular diet       I spent 30 minutes in the professional and overall care of this patient.      Jose Cooper MD

## 2024-11-22 ENCOUNTER — PATIENT OUTREACH (OUTPATIENT)
Dept: PRIMARY CARE | Facility: CLINIC | Age: 79
End: 2024-11-22
Payer: COMMERCIAL

## 2024-11-22 LAB
ATRIAL RATE: 74 BPM
P AXIS: 59 DEGREES
P OFFSET: 181 MS
P ONSET: 131 MS
PR INTERVAL: 190 MS
Q ONSET: 226 MS
QRS COUNT: 12 BEATS
QRS DURATION: 82 MS
QT INTERVAL: 370 MS
QTC CALCULATION(BAZETT): 410 MS
QTC FREDERICIA: 397 MS
R AXIS: 26 DEGREES
T AXIS: 20 DEGREES
T OFFSET: 411 MS
VENTRICULAR RATE: 74 BPM

## 2024-11-22 NOTE — PROGRESS NOTES
Discharge Facility: [facility]  Discharge Diagnosis:robotic simple prostatectomy with Dr. Cooper on 11/20; Benign prostatic hyperplasia with urinary retention   Discharge Date: 11.21.24    PCP Appointment Date:declined  Specialist Appointment Date: Urology 12.2.24  Hospital Encounter and Summary Linked: Yes  See discharge assessment below for further details   Engagement  Call Start Time: 1153 (11/22/2024 11:52 AM)    Medications  Medications reviewed with patient/caregiver?: Yes (11/22/2024 11:52 AM)  Is the patient having any side effects they believe may be caused by any medication additions or changes?: No (11/22/2024 11:52 AM)  Does the patient have all medications ordered at discharge?: Yes (11/22/2024 11:52 AM)  Care Management Interventions: No intervention needed (11/22/2024 11:52 AM)  Is the patient taking all medications as directed (includes completed medication regime)?: Yes (11/22/2024 11:52 AM)  Care Management Interventions: Provided patient education (11/22/2024 11:52 AM)  Medication Comments: Patient verbalized understanding of discharge medications. (11/22/2024 11:52 AM)    Appointments  Does the patient have a primary care provider?: Yes (11/22/2024 11:52 AM)  Care Management Interventions: Educated patient on importance of making appointment (declined) (11/22/2024 11:52 AM)  Has the patient kept scheduled appointments due by today?: Yes (11/22/2024 11:52 AM)    Self Management  What is the home health agency?: [facility] (11/22/2024 11:52 AM)  What Durable Medical Equipment (DME) was ordered?: n/a (11/22/2024 11:52 AM)    Patient Teaching  Does the patient have access to their discharge instructions?: Yes (11/22/2024 11:52 AM)  Care Management Interventions: Reviewed instructions with patient (11/22/2024 11:52 AM)  What is the patient's perception of their health status since discharge?: Improving (11/22/2024 11:52 AM)  Is the patient/caregiver able to teach back the hierarchy of who to call/visit  for symptoms/problems? PCP, Specialist, Home Health nurse, Urgent Care, ED, 911: Yes (11/22/2024 11:52 AM)  Patient/Caregiver Education Comments: Spoke with spouse. States he is very sore. Has a catheter now which they are hopefull wll be dcd at Uro appt on 12.2.24. Drainignclear yellow urine. Dressings intact. Declined appt with pcp at this itme. (11/22/2024 11:52 AM)

## 2024-11-25 ENCOUNTER — HOME CARE VISIT (OUTPATIENT)
Dept: HOME HEALTH SERVICES | Facility: HOME HEALTH | Age: 79
End: 2024-11-25
Payer: MEDICARE

## 2024-11-25 PROCEDURE — 169592 NO-PAY CLAIM PROCEDURE

## 2024-11-25 PROCEDURE — G0151 HHCP-SERV OF PT,EA 15 MIN: HCPCS | Mod: HHH

## 2024-11-25 SDOH — HEALTH STABILITY: PHYSICAL HEALTH: EXERCISE COMMENTS: HEEL TOE RAISE, MARCHING, LAQ, HIP ADDUCTION

## 2024-11-25 SDOH — HEALTH STABILITY: PHYSICAL HEALTH: EXERCISE TYPE: SEATED

## 2024-11-25 ASSESSMENT — ACTIVITIES OF DAILY LIVING (ADL)
ENTERING_EXITING_HOME: STAND BY ASSIST
AMBULATION ASSISTANCE ON FLAT SURFACES: 1
AMBULATION_DISTANCE/DURATION_TOLERATED: 25 FEET
OASIS_M1830: 03

## 2024-11-25 ASSESSMENT — ENCOUNTER SYMPTOMS
HIGHEST PAIN SEVERITY IN PAST 24 HOURS: 7/10
PAIN: 1
PAIN LOCATION: ABDOMEN
PERSON REPORTING PAIN: PATIENT
LOWEST PAIN SEVERITY IN PAST 24 HOURS: 1/10
PAIN LOCATION - PAIN SEVERITY: 5/10
SUBJECTIVE PAIN PROGRESSION: WAXING AND WANING

## 2024-11-29 LAB
LABORATORY COMMENT REPORT: NORMAL
PATH REPORT.FINAL DX SPEC: NORMAL
PATH REPORT.GROSS SPEC: NORMAL
PATH REPORT.RELEVANT HX SPEC: NORMAL
PATH REPORT.TOTAL CANCER: NORMAL

## 2024-12-02 ENCOUNTER — APPOINTMENT (OUTPATIENT)
Facility: CLINIC | Age: 79
End: 2024-12-02
Payer: MEDICARE

## 2024-12-02 VITALS
DIASTOLIC BLOOD PRESSURE: 77 MMHG | HEART RATE: 93 BPM | BODY MASS INDEX: 28.82 KG/M2 | WEIGHT: 173 LBS | HEIGHT: 65 IN | SYSTOLIC BLOOD PRESSURE: 126 MMHG

## 2024-12-02 DIAGNOSIS — N40.1 BENIGN PROSTATIC HYPERPLASIA WITH URINARY RETENTION: Primary | ICD-10-CM

## 2024-12-02 DIAGNOSIS — R33.8 BENIGN PROSTATIC HYPERPLASIA WITH URINARY RETENTION: Primary | ICD-10-CM

## 2024-12-02 PROCEDURE — 99024 POSTOP FOLLOW-UP VISIT: CPT | Performed by: STUDENT IN AN ORGANIZED HEALTH CARE EDUCATION/TRAINING PROGRAM

## 2024-12-02 PROCEDURE — 1159F MED LIST DOCD IN RCRD: CPT | Performed by: STUDENT IN AN ORGANIZED HEALTH CARE EDUCATION/TRAINING PROGRAM

## 2024-12-02 PROCEDURE — 3074F SYST BP LT 130 MM HG: CPT | Performed by: STUDENT IN AN ORGANIZED HEALTH CARE EDUCATION/TRAINING PROGRAM

## 2024-12-02 PROCEDURE — 1036F TOBACCO NON-USER: CPT | Performed by: STUDENT IN AN ORGANIZED HEALTH CARE EDUCATION/TRAINING PROGRAM

## 2024-12-02 PROCEDURE — 3078F DIAST BP <80 MM HG: CPT | Performed by: STUDENT IN AN ORGANIZED HEALTH CARE EDUCATION/TRAINING PROGRAM

## 2024-12-02 PROCEDURE — 1160F RVW MEDS BY RX/DR IN RCRD: CPT | Performed by: STUDENT IN AN ORGANIZED HEALTH CARE EDUCATION/TRAINING PROGRAM

## 2024-12-02 NOTE — PROGRESS NOTES
TOV   Chief Complaint    TOV        Referring physician: No ref. provider found     SUBJECTIVE:  HPI:   Tom Velasquez is a 79 y.o. male with a history of Parkinsons disease, 126g BPH s/p robotic simple prostatectomy with me 11/20 who presents for post op void trial.    Doing well since surgery.  Pain well controlled, ambulating, having Bms regularly, Sanderson draining yellow urine, continued on macrobid has another 24h left.  Incisions healing well.  Had some leakage around catheter two days ago, still draining well overall.    Attempted void trial, instilled 100ml but most leaked around catheter, does not feel full.    Path reviewed - benign.    Past Medical History:   Diagnosis Date    Bilateral sensorineural hearing loss 08/23/2024    Dyslipidemia 04/11/2024    Gastroesophageal reflux disease with esophagitis without hemorrhage 04/11/2024    Idiopathic Parkinson's disease (Multi) 09/05/2022    Other specified health status     No pertinent past medical history    Overactive bladder 07/15/2024    Parkinson's disease (Multi) 05/16/2022    Parkinsonism    Primary hypertension 04/11/2024    Right inguinal hernia 10/21/2003    Unspecified abdominal hernia without obstruction or gangrene     Hernia        Past medical, surgical, family and social history in the chart was reviewed and is accurate including any additions to what is in this HPI.    ROS:  14-point review of systems negative except as noted above.    OBJECTIVE:  Visit Vitals  /77   Pulse 93     Body mass index is 28.79 kg/m².  Physical Exam   General:  No acute distress  HEENT:  EOMI  CV:  Regular rate  Pulm:  Nonlabored respirations  Abd:  Soft, non-distended, lap incisions c/d/I, RUQ drain site well healed  :  No suprapubic or CVA tenderness  MSK:  No contractures  Neuro:  Motor intact  Psych:  Appropriate affect    Labs:  Lab Results   Component Value Date    WBC 13.9 (H) 11/21/2024    HGB 10.4 (L) 11/21/2024    HCT 31.2 (L) 11/21/2024      11/21/2024    CHOL 152 07/15/2024    TRIG 187 (H) 07/15/2024    HDL 53.4 07/15/2024    ALT 11 10/07/2024    AST 31 10/07/2024     (L) 11/21/2024    K 4.7 11/21/2024     11/21/2024    CREATININE 0.86 11/21/2024    BUN 25 (H) 11/21/2024    CO2 26 11/21/2024    TSH 1.97 04/11/2024    PSA 3.75 02/22/2023     Urine Culture (no units)   Date Value   11/06/2024 >100,000 Klebsiella pneumoniae/variicola (A)      Lab Results   Component Value Date    PSA 3.75 02/22/2023       IMAGING:  All imaging discussed in HPI was independently reviewed.    ASSESSMENT:  BPH 126g s/p RSP 11/20 - Sanderson removed today    PLAN:  Finish macrobid  Call or ED if unable to void  Follow-up as scheduled 2/20 flow/pvr    Jose Cooper MD    Problem List Items Addressed This Visit       Benign prostatic hyperplasia with urinary retention - Primary

## 2024-12-10 ENCOUNTER — PATIENT OUTREACH (OUTPATIENT)
Dept: PRIMARY CARE | Facility: CLINIC | Age: 79
End: 2024-12-10
Payer: COMMERCIAL

## 2024-12-10 NOTE — PROGRESS NOTES
Call after recent hospital discharge.   At time of outreach call the patient feels their condition has improved since discharge.

## 2025-01-10 ENCOUNTER — PATIENT OUTREACH (OUTPATIENT)
Dept: PRIMARY CARE | Facility: CLINIC | Age: 80
End: 2025-01-10
Payer: COMMERCIAL

## 2025-01-15 ENCOUNTER — APPOINTMENT (OUTPATIENT)
Dept: PRIMARY CARE | Facility: CLINIC | Age: 80
End: 2025-01-15
Payer: MEDICARE

## 2025-01-15 VITALS
BODY MASS INDEX: 29.66 KG/M2 | TEMPERATURE: 97.9 F | HEIGHT: 65 IN | SYSTOLIC BLOOD PRESSURE: 134 MMHG | RESPIRATION RATE: 20 BRPM | DIASTOLIC BLOOD PRESSURE: 75 MMHG | HEART RATE: 67 BPM | WEIGHT: 178 LBS | OXYGEN SATURATION: 97 %

## 2025-01-15 DIAGNOSIS — Z90.79 STATUS POST PROSTATECTOMY: ICD-10-CM

## 2025-01-15 DIAGNOSIS — D50.8 IRON DEFICIENCY ANEMIA SECONDARY TO INADEQUATE DIETARY IRON INTAKE: ICD-10-CM

## 2025-01-15 DIAGNOSIS — N39.0 CHRONIC UTI (URINARY TRACT INFECTION): Primary | ICD-10-CM

## 2025-01-15 DIAGNOSIS — I10 PRIMARY HYPERTENSION: ICD-10-CM

## 2025-01-15 DIAGNOSIS — R82.90 ABNORMAL URINALYSIS: ICD-10-CM

## 2025-01-15 DIAGNOSIS — R53.81 MALAISE AND FATIGUE: ICD-10-CM

## 2025-01-15 DIAGNOSIS — R35.0 URINARY FREQUENCY: ICD-10-CM

## 2025-01-15 DIAGNOSIS — R31.29 MICROSCOPIC HEMATURIA: ICD-10-CM

## 2025-01-15 DIAGNOSIS — R53.83 MALAISE AND FATIGUE: ICD-10-CM

## 2025-01-15 DIAGNOSIS — E78.2 MIXED HYPERLIPIDEMIA: ICD-10-CM

## 2025-01-15 LAB
ALBUMIN SERPL BCP-MCNC: 4.1 G/DL (ref 3.4–5)
ALP SERPL-CCNC: 86 U/L (ref 33–136)
ALT SERPL W P-5'-P-CCNC: 14 U/L (ref 10–52)
ANION GAP SERPL CALC-SCNC: 12 MMOL/L (ref 10–20)
APPEARANCE UR: CLEAR
AST SERPL W P-5'-P-CCNC: 18 U/L (ref 9–39)
BILIRUB SERPL-MCNC: 0.6 MG/DL (ref 0–1.2)
BILIRUB UR QL STRIP: NEGATIVE
BUN SERPL-MCNC: 19 MG/DL (ref 6–23)
CALCIUM SERPL-MCNC: 9.1 MG/DL (ref 8.6–10.6)
CHLORIDE SERPL-SCNC: 104 MMOL/L (ref 98–107)
CHOLEST SERPL-MCNC: 134 MG/DL (ref 0–199)
CHOLESTEROL/HDL RATIO: 2.8
CO2 SERPL-SCNC: 28 MMOL/L (ref 21–32)
COLOR UR: YELLOW
CREAT SERPL-MCNC: 0.78 MG/DL (ref 0.5–1.3)
EGFRCR SERPLBLD CKD-EPI 2021: 90 ML/MIN/1.73M*2
ERYTHROCYTE [DISTWIDTH] IN BLOOD BY AUTOMATED COUNT: 12.9 % (ref 11.5–14.5)
GLUCOSE SERPL-MCNC: 96 MG/DL (ref 74–99)
GLUCOSE UR STRIP-MCNC: NEGATIVE MG/DL
HCT VFR BLD AUTO: 39.6 % (ref 41–52)
HDLC SERPL-MCNC: 48.5 MG/DL
HGB BLD-MCNC: 12.1 G/DL (ref 13.5–17.5)
HGB UR QL STRIP: ABNORMAL
KETONES UR STRIP-MCNC: ABNORMAL MG/DL
LDLC SERPL CALC-MCNC: 61 MG/DL
LEUKOCYTE ESTERASE UR QL STRIP: ABNORMAL
MCH RBC QN AUTO: 26.8 PG (ref 26–34)
MCHC RBC AUTO-ENTMCNC: 30.6 G/DL (ref 32–36)
MCV RBC AUTO: 88 FL (ref 80–100)
NITRITE UR QL STRIP: NEGATIVE
NON HDL CHOLESTEROL: 86 MG/DL (ref 0–149)
NRBC BLD-RTO: 0 /100 WBCS (ref 0–0)
PH UR STRIP: 5.5 [PH]
PLATELET # BLD AUTO: 249 X10*3/UL (ref 150–450)
POTASSIUM SERPL-SCNC: 4.2 MMOL/L (ref 3.5–5.3)
PROT SERPL-MCNC: 6.3 G/DL (ref 6.4–8.2)
PROT UR STRIP-MCNC: ABNORMAL MG/DL
RBC # BLD AUTO: 4.51 X10*6/UL (ref 4.5–5.9)
SODIUM SERPL-SCNC: 140 MMOL/L (ref 136–145)
SP GR UR STRIP.AUTO: >=1.03
TRIGL SERPL-MCNC: 122 MG/DL (ref 0–149)
TSH SERPL-ACNC: 1.85 MIU/L (ref 0.44–3.98)
UROBILINOGEN UR STRIP-ACNC: 0.2 E.U./DL
VLDL: 24 MG/DL (ref 0–40)
WBC # BLD AUTO: 5 X10*3/UL (ref 4.4–11.3)

## 2025-01-15 PROCEDURE — 1123F ACP DISCUSS/DSCN MKR DOCD: CPT | Performed by: FAMILY MEDICINE

## 2025-01-15 PROCEDURE — 3078F DIAST BP <80 MM HG: CPT | Performed by: FAMILY MEDICINE

## 2025-01-15 PROCEDURE — 81003 URINALYSIS AUTO W/O SCOPE: CPT | Performed by: FAMILY MEDICINE

## 2025-01-15 PROCEDURE — 80061 LIPID PANEL: CPT

## 2025-01-15 PROCEDURE — 87186 SC STD MICRODIL/AGAR DIL: CPT

## 2025-01-15 PROCEDURE — 3075F SYST BP GE 130 - 139MM HG: CPT | Performed by: FAMILY MEDICINE

## 2025-01-15 PROCEDURE — 85027 COMPLETE CBC AUTOMATED: CPT

## 2025-01-15 PROCEDURE — 87086 URINE CULTURE/COLONY COUNT: CPT

## 2025-01-15 PROCEDURE — 80053 COMPREHEN METABOLIC PANEL: CPT

## 2025-01-15 PROCEDURE — 1159F MED LIST DOCD IN RCRD: CPT | Performed by: FAMILY MEDICINE

## 2025-01-15 PROCEDURE — 1036F TOBACCO NON-USER: CPT | Performed by: FAMILY MEDICINE

## 2025-01-15 PROCEDURE — 99214 OFFICE O/P EST MOD 30 MIN: CPT | Performed by: FAMILY MEDICINE

## 2025-01-15 PROCEDURE — 1126F AMNT PAIN NOTED NONE PRSNT: CPT | Performed by: FAMILY MEDICINE

## 2025-01-15 PROCEDURE — 1160F RVW MEDS BY RX/DR IN RCRD: CPT | Performed by: FAMILY MEDICINE

## 2025-01-15 PROCEDURE — 84443 ASSAY THYROID STIM HORMONE: CPT

## 2025-01-15 RX ORDER — NITROFURANTOIN 25; 75 MG/1; MG/1
100 CAPSULE ORAL DAILY
Qty: 30 CAPSULE | Refills: 0 | Status: SHIPPED | OUTPATIENT
Start: 2025-01-15 | End: 2025-02-14

## 2025-01-15 ASSESSMENT — PATIENT HEALTH QUESTIONNAIRE - PHQ9
SUM OF ALL RESPONSES TO PHQ9 QUESTIONS 1 AND 2: 0
SUM OF ALL RESPONSES TO PHQ9 QUESTIONS 1 AND 2: 0
1. LITTLE INTEREST OR PLEASURE IN DOING THINGS: NOT AT ALL
2. FEELING DOWN, DEPRESSED OR HOPELESS: NOT AT ALL
2. FEELING DOWN, DEPRESSED OR HOPELESS: NOT AT ALL
1. LITTLE INTEREST OR PLEASURE IN DOING THINGS: NOT AT ALL

## 2025-01-15 ASSESSMENT — PAIN SCALES - GENERAL: PAINLEVEL_OUTOF10: 0-NO PAIN

## 2025-01-15 NOTE — PROGRESS NOTES
Subjective   Tom Velasquez is a 79 y.o. male who presents for Follow-up (Follow up visit, blood pressure check and blood work completed today).    HPI  : Patient is a 79-year-old male who had prostate surgery in November due to right hydronephrosis and also a kidney stone.  He also had a urinary tract infection and was treated with antibiotics.  He did have to remain with a Sanderson catheter for several days and then it was finally discontinued by urology.  He states he is urinating much better but has urinary urgency and frequency with drinking liquids.  We will check blood work on him today to assess his renal function and also a urine specimen to evaluate for infection.  He states he is feeling much better and does not have the severe pain that he had from the kidney stone and hydronephrosis of the right kidney.    Objective   : ROS : 10 systems were reviewed and the information is included in the HPI and no additional review of systems is indicated.    Physical Exam  Vitals and nursing note reviewed.   Constitutional:       Appearance: Normal appearance.      Comments: Patient is alert and oriented x3.   No acute distress and does have Parkinson type facies.   HENT:      Head: Normocephalic.      Right Ear: Tympanic membrane and external ear normal.      Left Ear: Tympanic membrane and external ear normal.      Ears:      Comments: Ears are patent bilaterally and TMs are clear.     Nose: Nose normal.      Mouth/Throat:      Mouth: Mucous membranes are moist.      Pharynx: Oropharynx is clear.      Comments: Mouth is moist, tongue is midline.  No posterior pharyngeal erythema.  Eyes:      Extraocular Movements: Extraocular movements intact.      Conjunctiva/sclera: Conjunctivae normal.      Pupils: Pupils are equal, round, and reactive to light.      Comments: Patient does wear glasses and follows with an ophthalmologist.   Neck:      Comments: No carotid bruits, no thyromegaly, no cervical adenopathy.  Occasional  neck spasm and restriction of motion secondary to stress and tension.  Cardiovascular:      Rate and Rhythm: Normal rate and regular rhythm.      Pulses: Normal pulses.      Heart sounds: Normal heart sounds.      Comments: Patient denies chest pain and no palpitations.  Heart rhythm is stable S1 and S2 are noted, no ectopics.  Pulmonary:      Effort: Pulmonary effort is normal.      Comments: Patient denies any coughing or wheezing.  Lungs are clear to auscultation.    Abdominal:      General: Bowel sounds are normal.      Palpations: Abdomen is soft.      Comments: Abdomen is soft and non tender.   No flank tenderness.  No suprapubic pain.  Positive bowel sounds.  No abdominal guarding and no rebound tenderness.   Genitourinary:     Comments: Patient had prostate surgery in November due to right hydronephrosis and urinary obstruction from enlarged prostate.  He is urinating much better now but does have urgency and frequency and if he does not find a restroom right away incontinence.  Patient has an appointment with urology in February but I will also check his urine specimen today.  Musculoskeletal:         General: Normal range of motion.      Cervical back: Normal range of motion.      Comments: Arthritis of the hands and fingers.  Osteoarthritis of both knees.  Age-related arthritis in the joints.  Restriction of motion cervical and lumbar spines due to arthritis, and muscle spasm.   Skin:     General: Skin is warm and dry.      Comments: There is no bruising, no erythema, no skin lesions noted, no rashes.   Neurological:      General: No focal deficit present.      Mental Status: He is alert and oriented to person, place, and time. Mental status is at baseline.      Sensory: Sensory deficit present.      Comments: Patient does have early Parkinson's disease with a tremor of his hands and fingers.  He does follow with neurology and is on Sinemet and also amantadine.  He does have a slightly unsteady gait and  has to be cautious of his balance.  He does try to stay active for his age of 79.   Psychiatric:         Thought Content: Thought content normal.         Judgment: Judgment normal.      Comments: Patient has flat mood and affect which is also consistent with his Parkinson's disease.  He does not speak very much English and his wife does try to help interpret [place] language for him.  Thought content and judgment are stable.  Mild age-related forgetfulness.     PLAN : Patient is an elderly 79-year-old male who is in with his wife and he speaks mainly Norwegian.  His wife does interpret for him and he did have prostate surgery and a kidney stone with right hydronephrosis treated in November by urology.  He is stable now but he does have urinary urgency and frequency.  His urine specimen shows a pH of 5.5, specific gravity of 1.030, moderate leukocytes 100 mg/dL protein, large blood, negative glucose.  This will be cultured and he was started on nitrofurantoin 100 mg daily.  Has a follow-up appointment with urology in February.  We also are doing some blood work today I will review his renal function and also blood counts for his anemia.  He will be notified of these results in 3 days and further recommendations will be made at that time.  Patient will follow-up in 4 to 6 months or sooner as needed pending the blood work results.    Problem List Items Addressed This Visit       Malaise and fatigue    Relevant Orders    CBC    Comprehensive Metabolic Panel    Lipid Panel    Thyroid Stimulating Hormone    Primary hypertension    Relevant Orders    CBC    Comprehensive Metabolic Panel    Lipid Panel    Thyroid Stimulating Hormone    Hyperlipidemia, unspecified    Relevant Orders    CBC    Comprehensive Metabolic Panel    Lipid Panel    Thyroid Stimulating Hormone     Other Visit Diagnoses       Chronic UTI (urinary tract infection)    -  Primary    Relevant Medications    nitrofurantoin, macrocrystal-monohydrate,  (Macrobid) 100 mg capsule    Urinary frequency        Relevant Orders    POCT UA (Automated) docked device    Abnormal urinalysis        Relevant Orders    Urine Culture                 Derrick Dodson DO

## 2025-01-17 NOTE — RESULT ENCOUNTER NOTE
Given these results to the patient's wife as he does not speak very good English .  blood sugar, kidney and liver function are stable     white blood cell count is normal         slight anemia at 12.1   and he could take a vitamin with iron daily    to improve that.     Thyroid function is normal         cholesterol is normal at 134      triglycerides are normal at 122    urine shows a mild infection.    Finish the antibiotic.   Other blood work is stable.

## 2025-01-18 LAB — BACTERIA UR CULT: ABNORMAL

## 2025-01-19 DIAGNOSIS — N30.01 ACUTE CYSTITIS WITH HEMATURIA: Primary | ICD-10-CM

## 2025-01-19 RX ORDER — CIPROFLOXACIN 500 MG/1
500 TABLET ORAL 2 TIMES DAILY
Qty: 20 TABLET | Refills: 0 | Status: SHIPPED | OUTPATIENT
Start: 2025-01-19 | End: 2025-01-29

## 2025-01-19 NOTE — RESULT ENCOUNTER NOTE
I sent in the antibiotic Cipro which he can switch to from the nitrofurantoin due to the urinary culture and sensitivity.    He can take the nitrofurantoin when he is done with the Cipro.

## 2025-02-16 ENCOUNTER — PATIENT OUTREACH (OUTPATIENT)
Dept: PRIMARY CARE | Facility: CLINIC | Age: 80
End: 2025-02-16
Payer: COMMERCIAL

## 2025-02-20 ENCOUNTER — APPOINTMENT (OUTPATIENT)
Facility: CLINIC | Age: 80
End: 2025-02-20
Payer: MEDICARE

## 2025-02-20 VITALS
BODY MASS INDEX: 29.39 KG/M2 | SYSTOLIC BLOOD PRESSURE: 130 MMHG | TEMPERATURE: 98.8 F | WEIGHT: 176.4 LBS | HEIGHT: 65 IN | HEART RATE: 75 BPM | DIASTOLIC BLOOD PRESSURE: 83 MMHG

## 2025-02-20 DIAGNOSIS — R33.9 URINARY RETENTION: ICD-10-CM

## 2025-02-20 PROCEDURE — 1159F MED LIST DOCD IN RCRD: CPT | Performed by: STUDENT IN AN ORGANIZED HEALTH CARE EDUCATION/TRAINING PROGRAM

## 2025-02-20 PROCEDURE — 3079F DIAST BP 80-89 MM HG: CPT | Performed by: STUDENT IN AN ORGANIZED HEALTH CARE EDUCATION/TRAINING PROGRAM

## 2025-02-20 PROCEDURE — 1036F TOBACCO NON-USER: CPT | Performed by: STUDENT IN AN ORGANIZED HEALTH CARE EDUCATION/TRAINING PROGRAM

## 2025-02-20 PROCEDURE — 51798 US URINE CAPACITY MEASURE: CPT | Performed by: STUDENT IN AN ORGANIZED HEALTH CARE EDUCATION/TRAINING PROGRAM

## 2025-02-20 PROCEDURE — 1123F ACP DISCUSS/DSCN MKR DOCD: CPT | Performed by: STUDENT IN AN ORGANIZED HEALTH CARE EDUCATION/TRAINING PROGRAM

## 2025-02-20 PROCEDURE — 1160F RVW MEDS BY RX/DR IN RCRD: CPT | Performed by: STUDENT IN AN ORGANIZED HEALTH CARE EDUCATION/TRAINING PROGRAM

## 2025-02-20 PROCEDURE — 99213 OFFICE O/P EST LOW 20 MIN: CPT | Performed by: STUDENT IN AN ORGANIZED HEALTH CARE EDUCATION/TRAINING PROGRAM

## 2025-02-20 PROCEDURE — 3075F SYST BP GE 130 - 139MM HG: CPT | Performed by: STUDENT IN AN ORGANIZED HEALTH CARE EDUCATION/TRAINING PROGRAM

## 2025-02-20 NOTE — PROGRESS NOTES
Chief complaint:  Follow-up (PVR)  Referring physician:  No ref. provider found     SUBJECTIVE:  HPI:  Tom Velasquez is a 80 y.o. male with a history of Parkinsons disease, 126g BPH s/p robotic simple prostatectomy (path benign) with me 11/20 who presents for follow up with PVR.  Here with his wife, Alonso.    One Acinetobacter UTI in January, treated with cipro and symptoms (urgency/frequency) improved.  Otherwise voiding well, not complaining of leakage.  PVR 0ml  Last PSA 2/2023 3.75    Medical history:   has a past medical history of Bilateral sensorineural hearing loss (08/23/2024), Dyslipidemia (04/11/2024), Gastroesophageal reflux disease with esophagitis without hemorrhage (04/11/2024), Idiopathic Parkinson's disease (Multi) (09/05/2022), Other specified health status, Overactive bladder (07/15/2024), Parkinson's disease (Multi) (05/16/2022), Primary hypertension (04/11/2024), Right inguinal hernia (10/21/2003), and Unspecified abdominal hernia without obstruction or gangrene.   Surgical history:   has a past surgical history that includes Hernia repair (Right).  Family history:  family history includes No Known Problems in his father.  Social history:   reports that he has never smoked. He has never been exposed to tobacco smoke. He has never used smokeless tobacco. He reports that he does not drink alcohol and does not use drugs.    Medications:    Current Outpatient Medications   Medication Instructions    amantadine (SYMMETREL) 100 mg, Daily    atorvastatin (LIPITOR) 20 mg, oral, Daily    carbidopa-levodopa (Sinemet)  mg tablet 1.5 tablets, 3 times daily    pantoprazole (PROTONIX) 40 mg, oral, Daily, DO NOT CRUSH CHEW OR SPLIT    tamsulosin (FLOMAX) 0.4 mg, oral, Every 12 hours scheduled (0630,1830)      Allergies:    Allergies   Allergen Reactions    Sulfa (Sulfonamide Antibiotics) GI Upset and Unknown      ROS:  14-point review of systems negative except as noted above.    OBJECTIVE:  Visit  Vitals  /83   Pulse 75   Temp 37.1 °C (98.8 °F)   Body mass index is 29.35 kg/m².    Physical exam  General:  No acute distress  HEENT:  EOMI  CV:  Regular rate  Pulm:  Nonlabored respirations  Abd:  Soft, non-distended  :  No suprapubic or CVA tenderness  MSK:  No contractures  Neuro:  Motor intact  Psych:  Appropriate affect    Labs:    Lab Results   Component Value Date    WBC 5.0 01/15/2025    HGB 12.1 (L) 01/15/2025    HCT 39.6 (L) 01/15/2025     01/15/2025    ALT 14 01/15/2025    AST 18 01/15/2025     01/15/2025    K 4.2 01/15/2025     01/15/2025    CREATININE 0.78 01/15/2025    BUN 19 01/15/2025    CO2 28 01/15/2025     Urine Culture (no units)   Date Value   01/15/2025 (A)    20,000 - 80,000 CFU/mL Acinetobacter calcoaceticus-baumannii complex      Lab Results   Component Value Date    PSA 3.75 02/22/2023       Imaging:  All imaging discussed in HPI was independently reviewed.    ASSESSMENT:  BPH (126g) s/p RASP 11/20/24 - doing well overall aside from 1 UTI.  Advised if any further issues such as recurrent UTIs to let my office know.    PLAN:  Call if any urinary issues eg Kaylynn  No need for continued PSA screening    Follow-up 1 year with PVR    Jose Cooper MD    Problem List Items Addressed This Visit       Urinary retention    Relevant Orders    Post-Void Residual (Completed)

## 2025-03-23 DIAGNOSIS — K21.00 GASTROESOPHAGEAL REFLUX DISEASE WITH ESOPHAGITIS WITHOUT HEMORRHAGE: ICD-10-CM

## 2025-03-25 RX ORDER — PANTOPRAZOLE SODIUM 40 MG/1
40 TABLET, DELAYED RELEASE ORAL DAILY
Qty: 30 TABLET | Refills: 5 | Status: SHIPPED | OUTPATIENT
Start: 2025-03-25

## 2025-04-15 ENCOUNTER — APPOINTMENT (OUTPATIENT)
Dept: PRIMARY CARE | Facility: CLINIC | Age: 80
End: 2025-04-15
Payer: COMMERCIAL

## 2025-04-15 VITALS
WEIGHT: 177 LBS | BODY MASS INDEX: 29.49 KG/M2 | OXYGEN SATURATION: 96 % | RESPIRATION RATE: 16 BRPM | TEMPERATURE: 97.6 F | HEART RATE: 70 BPM | SYSTOLIC BLOOD PRESSURE: 114 MMHG | DIASTOLIC BLOOD PRESSURE: 62 MMHG | HEIGHT: 65 IN

## 2025-04-15 DIAGNOSIS — Z90.79 STATUS POST PROSTATECTOMY: ICD-10-CM

## 2025-04-15 DIAGNOSIS — N32.81 OVERACTIVE BLADDER: ICD-10-CM

## 2025-04-15 DIAGNOSIS — R53.83 MALAISE AND FATIGUE: ICD-10-CM

## 2025-04-15 DIAGNOSIS — G20.B2 PARKINSON'S DISEASE WITH DYSKINESIA AND FLUCTUATING MANIFESTATIONS: ICD-10-CM

## 2025-04-15 DIAGNOSIS — I10 PRIMARY HYPERTENSION: ICD-10-CM

## 2025-04-15 DIAGNOSIS — H61.23 BILATERAL IMPACTED CERUMEN: ICD-10-CM

## 2025-04-15 DIAGNOSIS — R53.81 MALAISE AND FATIGUE: ICD-10-CM

## 2025-04-15 DIAGNOSIS — H61.23 IMPACTED CERUMEN OF BOTH EARS: Primary | ICD-10-CM

## 2025-04-15 DIAGNOSIS — D50.8 IRON DEFICIENCY ANEMIA SECONDARY TO INADEQUATE DIETARY IRON INTAKE: ICD-10-CM

## 2025-04-15 LAB
APPEARANCE UR: CLEAR
BILIRUB UR QL STRIP: NEGATIVE
COLOR UR: YELLOW
GLUCOSE UR STRIP-MCNC: NEGATIVE MG/DL
HGB UR QL STRIP: NEGATIVE
KETONES UR STRIP-MCNC: NEGATIVE MG/DL
LEUKOCYTE ESTERASE UR QL STRIP: NEGATIVE
NITRITE UR QL STRIP: NEGATIVE
PH UR STRIP: 7 [PH]
PROT UR STRIP-MCNC: NEGATIVE MG/DL
SP GR UR STRIP.AUTO: 1.02
UROBILINOGEN UR STRIP-ACNC: 0.2 E.U./DL

## 2025-04-15 PROCEDURE — 99214 OFFICE O/P EST MOD 30 MIN: CPT | Performed by: FAMILY MEDICINE

## 2025-04-15 PROCEDURE — 1036F TOBACCO NON-USER: CPT | Performed by: FAMILY MEDICINE

## 2025-04-15 PROCEDURE — 1123F ACP DISCUSS/DSCN MKR DOCD: CPT | Performed by: FAMILY MEDICINE

## 2025-04-15 PROCEDURE — 3078F DIAST BP <80 MM HG: CPT | Performed by: FAMILY MEDICINE

## 2025-04-15 PROCEDURE — 3074F SYST BP LT 130 MM HG: CPT | Performed by: FAMILY MEDICINE

## 2025-04-15 PROCEDURE — 1159F MED LIST DOCD IN RCRD: CPT | Performed by: FAMILY MEDICINE

## 2025-04-15 PROCEDURE — 81003 URINALYSIS AUTO W/O SCOPE: CPT | Performed by: FAMILY MEDICINE

## 2025-04-15 PROCEDURE — 1126F AMNT PAIN NOTED NONE PRSNT: CPT | Performed by: FAMILY MEDICINE

## 2025-04-15 PROCEDURE — 1160F RVW MEDS BY RX/DR IN RCRD: CPT | Performed by: FAMILY MEDICINE

## 2025-04-15 PROCEDURE — 69210 REMOVE IMPACTED EAR WAX UNI: CPT | Performed by: FAMILY MEDICINE

## 2025-04-15 ASSESSMENT — PAIN SCALES - GENERAL: PAINLEVEL_OUTOF10: 0-NO PAIN

## 2025-04-15 NOTE — PROGRESS NOTES
Subjective   Tom Velasquez is a 80 y.o. male who presents for Follow-up (Follow up visit, blood pressure check and blood work completed today).    HPI  : Patient is an 80-year-old elderly male who suffers with Parkinson's disease and is in today for a follow-up visit and he is trying to get fitted for hearing aids but has impacted cerumen that needs to be irrigated.  He will have a ear lavage today and also recheck on blood pressure and urinalysis for recent prostate and urinary problems.  He did have prostate surgery and he is voiding much better and we will make sure his UTI infection is cleared.  He also needs his blood counts rechecked electrolytes, and liver enzymes.  Aside from his Parkinson's he is quite healthy for the age of 80 but only speaks broken English and mainly Persian.  He is with his wife who interprets for him.    Objective  : ROS : 10 systems were reviewed and the information is included in the HPI and no additional review of systems is indicated.    Physical Exam  Vitals and nursing note reviewed.   Constitutional:       Appearance: Normal appearance. He is normal weight.      Comments: Patient is alert and oriented x3.   No acute distress   HENT:      Head: Normocephalic.      Right Ear: Tympanic membrane normal. There is impacted cerumen.      Left Ear: Tympanic membrane normal. There is impacted cerumen.      Ears:      Comments: Bilateral impacted cerumen with earwax.  They will be irrigated today.  Patient is trying to get fitted for hearing aids.     Nose: Nose normal.      Mouth/Throat:      Mouth: Mucous membranes are moist.      Pharynx: Oropharynx is clear.      Comments: Mouth is moist, tongue is midline.  No posterior pharyngeal erythema.  Eyes:      Extraocular Movements: Extraocular movements intact.      Conjunctiva/sclera: Conjunctivae normal.      Pupils: Pupils are equal, round, and reactive to light.      Comments: No visual disturbance, does have his eyes examined once a  year.   Neck:      Comments: Restricted range of motion cervical spine due to Parkinson's and some rigidity.  No carotid bruits, no thyromegaly, no cervical adenopathy.  Cardiovascular:      Rate and Rhythm: Normal rate and regular rhythm.      Pulses: Normal pulses.      Heart sounds: Normal heart sounds.      Comments: Patient denies chest pain and no palpitations.  Heart rhythm is stable S1 and S2 are noted, no ectopics.  Pulmonary:      Breath sounds: Normal breath sounds.      Comments: Shallow depth of inspiration.  Patient denies any coughing or wheezing.  Lungs are clear to auscultation.    Abdominal:      General: Bowel sounds are normal.      Palpations: Abdomen is soft.      Comments: Abdomen is soft and non tender. No flank tenderness.  No suprapubic pain.  Positive bowel sounds .  No abdominal guarding and no rebound tenderness.   Genitourinary:     Comments: Had prostate surgery and feeling much better.  Urine will be rechecked today for recent UTI infection.  That occurred after his prostate surgery.  Overactive bladder problems and recent UTI infection.  Musculoskeletal:         General: Normal range of motion.      Cervical back: Normal range of motion. Tenderness present.      Comments: Age-related arthritis in the joints.  Mild restriction of motion cervical and lumbar spines due to muscle spasm.   Skin:     General: Skin is warm and dry.      Comments: There is no bruising, no erythema, no skin lesions noted, no rashes.   Neurological:      General: No focal deficit present.      Mental Status: He is alert and oriented to person, place, and time. Mental status is at baseline.      Sensory: Sensory deficit present.      Motor: Weakness present.      Coordination: Coordination abnormal.      Gait: Gait abnormal.      Comments: Patient is under treatment from neurology for Parkinson's disease.  He is on Symmetrel and also Sinemet.  He does have some resting tremors.  Gait and coordination are not  completely stable.  No focal neurosensory deficits are noted.  He also has a blank stare with Parkinsons facies.    Psychiatric:         Mood and Affect: Mood normal.         Behavior: Behavior normal.         Thought Content: Thought content normal.         Judgment: Judgment normal.      Comments: Patient has flat  mood and affect.  Patient only speaks broken English and mainly [place].  Thought content and judgment are stable.   Behavior is affected by his Parkinson's disease.     PLAN : Patient is an 80-year-old male who has Parkinson's disease and does not speak very much English but his wife is with him to interpret.  He recently had prostate surgery for BPH and urinary retention.  He also had a urinary infection his last visit and he did have his urine checked today.  pH was 7.0, specific gravity 1.025, negative leukocytes, negative protein, negative blood, negative glucose.  Patient did have his blood counts rechecked today and also his electrolytes and liver enzymes.  He will be notified of those results in 3 days.  I also did irrigation of both his ear canals for impacted cerumen since he is scheduled to have hearing aids.  Patient otherwise is stable we will follow-up in 4 to 6 months.    Problem List Items Addressed This Visit       Malaise and fatigue    Relevant Orders    CBC    Comprehensive Metabolic Panel    POCT UA (Automated) docked device    Primary hypertension    Relevant Orders    CBC    Comprehensive Metabolic Panel    POCT UA (Automated) docked device    Iron deficiency anemia secondary to inadequate dietary iron intake    Relevant Orders    CBC    Comprehensive Metabolic Panel    POCT UA (Automated) docked device            Derrick Dodson DO

## 2025-04-16 LAB
ALBUMIN SERPL-MCNC: 4.2 G/DL (ref 3.6–5.1)
ALP SERPL-CCNC: 68 U/L (ref 35–144)
ALT SERPL-CCNC: 12 U/L (ref 9–46)
ANION GAP SERPL CALCULATED.4IONS-SCNC: 9 MMOL/L (CALC) (ref 7–17)
AST SERPL-CCNC: 19 U/L (ref 10–35)
BILIRUB SERPL-MCNC: 1.1 MG/DL (ref 0.2–1.2)
BUN SERPL-MCNC: 16 MG/DL (ref 7–25)
CALCIUM SERPL-MCNC: 9.3 MG/DL (ref 8.6–10.3)
CHLORIDE SERPL-SCNC: 102 MMOL/L (ref 98–110)
CO2 SERPL-SCNC: 28 MMOL/L (ref 20–32)
CREAT SERPL-MCNC: 0.82 MG/DL (ref 0.7–1.22)
EGFRCR SERPLBLD CKD-EPI 2021: 88 ML/MIN/1.73M2
ERYTHROCYTE [DISTWIDTH] IN BLOOD BY AUTOMATED COUNT: 18 % (ref 11–15)
GLUCOSE SERPL-MCNC: 69 MG/DL (ref 65–99)
HCT VFR BLD AUTO: 47.6 % (ref 38.5–50)
HGB BLD-MCNC: 14.6 G/DL (ref 13.2–17.1)
MCH RBC QN AUTO: 27.9 PG (ref 27–33)
MCHC RBC AUTO-ENTMCNC: 30.7 G/DL (ref 32–36)
MCV RBC AUTO: 91 FL (ref 80–100)
PLATELET # BLD AUTO: 234 THOUSAND/UL (ref 140–400)
PMV BLD REES-ECKER: 10 FL (ref 7.5–12.5)
POTASSIUM SERPL-SCNC: 4.3 MMOL/L (ref 3.5–5.3)
PROT SERPL-MCNC: 6.3 G/DL (ref 6.1–8.1)
RBC # BLD AUTO: 5.23 MILLION/UL (ref 4.2–5.8)
SODIUM SERPL-SCNC: 139 MMOL/L (ref 135–146)
WBC # BLD AUTO: 6.4 THOUSAND/UL (ref 3.8–10.8)

## 2025-04-17 NOTE — RESULT ENCOUNTER NOTE
You may have to give these results to the wife since he does not speak English.  Red and white blood cell counts are normal      platelet count is normal      blood sugar, kidney and liver function are normal       urine specimen was normal             everything looks good and stable         keep up the good work

## 2025-04-24 DIAGNOSIS — H93.8X1 BLOCKED EAR, RIGHT: ICD-10-CM

## 2025-05-14 ENCOUNTER — OFFICE VISIT (OUTPATIENT)
Dept: OTOLARYNGOLOGY | Facility: CLINIC | Age: 80
End: 2025-05-14
Payer: MEDICARE

## 2025-05-14 VITALS
SYSTOLIC BLOOD PRESSURE: 114 MMHG | BODY MASS INDEX: 29.66 KG/M2 | HEART RATE: 66 BPM | WEIGHT: 178 LBS | DIASTOLIC BLOOD PRESSURE: 71 MMHG | HEIGHT: 65 IN | TEMPERATURE: 98.2 F

## 2025-05-14 DIAGNOSIS — H93.8X1 SENSATION OF PLUGGED EAR ON RIGHT SIDE: ICD-10-CM

## 2025-05-14 DIAGNOSIS — H61.21 EXCESSIVE CERUMEN IN RIGHT EAR CANAL: Primary | ICD-10-CM

## 2025-05-14 PROCEDURE — 1126F AMNT PAIN NOTED NONE PRSNT: CPT | Performed by: NURSE PRACTITIONER

## 2025-05-14 PROCEDURE — 99203 OFFICE O/P NEW LOW 30 MIN: CPT | Performed by: NURSE PRACTITIONER

## 2025-05-14 PROCEDURE — 1159F MED LIST DOCD IN RCRD: CPT | Performed by: NURSE PRACTITIONER

## 2025-05-14 PROCEDURE — 3078F DIAST BP <80 MM HG: CPT | Performed by: NURSE PRACTITIONER

## 2025-05-14 PROCEDURE — 3074F SYST BP LT 130 MM HG: CPT | Performed by: NURSE PRACTITIONER

## 2025-05-14 PROCEDURE — 99213 OFFICE O/P EST LOW 20 MIN: CPT | Performed by: NURSE PRACTITIONER

## 2025-05-14 PROCEDURE — 1036F TOBACCO NON-USER: CPT | Performed by: NURSE PRACTITIONER

## 2025-05-14 SDOH — ECONOMIC STABILITY: FOOD INSECURITY: WITHIN THE PAST 12 MONTHS, YOU WORRIED THAT YOUR FOOD WOULD RUN OUT BEFORE YOU GOT MONEY TO BUY MORE.: NEVER TRUE

## 2025-05-14 SDOH — ECONOMIC STABILITY: FOOD INSECURITY: WITHIN THE PAST 12 MONTHS, THE FOOD YOU BOUGHT JUST DIDN'T LAST AND YOU DIDN'T HAVE MONEY TO GET MORE.: NEVER TRUE

## 2025-05-14 ASSESSMENT — PATIENT HEALTH QUESTIONNAIRE - PHQ9
SUM OF ALL RESPONSES TO PHQ9 QUESTIONS 1 AND 2: 0
1. LITTLE INTEREST OR PLEASURE IN DOING THINGS: NOT AT ALL
2. FEELING DOWN, DEPRESSED OR HOPELESS: NOT AT ALL

## 2025-05-14 ASSESSMENT — LIFESTYLE VARIABLES
AUDIT-C TOTAL SCORE: 0
SKIP TO QUESTIONS 9-10: 1
HOW OFTEN DO YOU HAVE SIX OR MORE DRINKS ON ONE OCCASION: NEVER
HOW OFTEN DO YOU HAVE A DRINK CONTAINING ALCOHOL: NEVER
HOW MANY STANDARD DRINKS CONTAINING ALCOHOL DO YOU HAVE ON A TYPICAL DAY: PATIENT DOES NOT DRINK

## 2025-05-14 ASSESSMENT — ENCOUNTER SYMPTOMS
DEPRESSION: 0
OCCASIONAL FEELINGS OF UNSTEADINESS: 0
LOSS OF SENSATION IN FEET: 0

## 2025-05-14 ASSESSMENT — PAIN SCALES - GENERAL: PAINLEVEL_OUTOF10: 0-NO PAIN

## 2025-05-14 NOTE — PROGRESS NOTES
Subjective   Patient ID: Tom Velasquez is a 80 y.o. male who presents for ear cleaning.    HPI  Patient here for right ear cleaning. He is accompanied by a family member. Having trouble hearing out of the right ear. No ear pain. No previous ear surgery. He wears bilateral hearing aids.    I reviewed patient's past medical and surgical history.  Problem List[1]  Surgical History[2]    Review of Systems    All other systems have been reviewed and are negative for complaints except for those mentioned in history of present illness, past medical history and problem list.     Objective   Physical Exam    Constitutional: No fever, chills, weight loss or weight gain  General appearance: Appears well, well-nourished, well groomed. No acute distress.    Communication: Normal communication    Psychiatric: Oriented to person, place and time. Normal mood and affect.    Neurologic: Cranial nerves II-XII grossly intact and symmetric bilaterally.    Head and Face:  Head: Atraumatic with no masses, lesions or scarring.  Face: Normal symmetry. No scars or deformities.  TMJ: Normal, no trismus.    Eyes: Conjunctiva not edematous or erythematous.     Right Ear: External inspection of ear with no deformity, scars, or masses. EAC is with excessive cerumen that was removed using the microscope and suction.  TM is intact with no sign of infection, effusion, or retraction.  No perforation seen.     Left Ear: External inspection of ear with no deformity, scars, or masses. EAC is clear.  TM is intact with no sign of infection, effusion, or retraction.  No perforation seen.     Nose: External inspection of nose: No nasal lesions, lacerations or scars. Anterior rhinoscopy with limited visualization past the inferior turbinates. No tenderness on frontal or maxillary sinus palpation.    Oral Cavity/Mouth: Oral cavity and oropharynx mucosa moist and pink. No lesions or masses. Tonsils appear normal. Uvula is midline. Tongue with no masses or  lesions. Tongue with good mobility. The oropharynx is clear.    Neck: Normal appearing, symmetric, trachea midline.     Cardiovascular: Examination of peripheral vascular system shows no clubbing or cyanosis.    Respiratory: No respiratory distress increased work of breathing. Inspection of the chest with symmetric chest expansion and normal respiratory effort.    Skin: No head and neck rashes.    Lymph nodes: No adenopathy.    Assessment/Plan   Diagnoses and all orders for this visit:  Excessive cerumen in right ear canal  Sensation of plugged ear on right side    Ear was successfully cleaned. Minimal cerumen left. Recommend using sweet oil in the ear for 2-3 days.    He does feel better after the cleaning.  He may follow up as needed.   All questions answered to patient satisfaction.        Shilpi Torres, BIN-CNP 05/14/25 9:31 AM        [1]   Patient Active Problem List  Diagnosis    Idiopathic Parkinson's disease (Multi)    Screening PSA (prostate specific antigen)    Gastroesophageal reflux disease with esophagitis without hemorrhage    Vitamin D deficiency    Malaise and fatigue    Dyslipidemia    Primary hypertension    Elevated PSA    Overactive bladder    Hyperlipidemia, unspecified    Bilateral sensorineural hearing loss    Bilateral impacted cerumen    Nerve pain    Right inguinal hernia    Sensation of plugged ear on right side    Shoulder pain    Urinary retention    Kidney stone on right side    Hospital discharge follow-up    Right ureteral calculus    Benign prostatic hyperplasia with urinary retention    History of robot-assisted laparoscopic radical prostatectomy    Benign prostatic hyperplasia (BPH) with urinary urgency    Iron deficiency anemia secondary to inadequate dietary iron intake    Chronic UTI (urinary tract infection)    Abnormal urinalysis    Urinary frequency    Status post prostatectomy    Microscopic hematuria    Parkinson's disease with dyskinesia and fluctuating manifestations    [2]   Past Surgical History:  Procedure Laterality Date    HERNIA REPAIR Right

## 2025-06-11 DIAGNOSIS — E78.5 DYSLIPIDEMIA: ICD-10-CM

## 2025-06-11 RX ORDER — ATORVASTATIN CALCIUM 20 MG/1
20 TABLET, FILM COATED ORAL DAILY
Qty: 90 TABLET | Refills: 3 | Status: SHIPPED | OUTPATIENT
Start: 2025-06-11

## 2025-08-27 ENCOUNTER — APPOINTMENT (OUTPATIENT)
Age: 80
End: 2025-08-27
Payer: MEDICARE

## 2025-10-15 ENCOUNTER — APPOINTMENT (OUTPATIENT)
Dept: PRIMARY CARE | Facility: CLINIC | Age: 80
End: 2025-10-15
Payer: MEDICARE

## 2026-02-24 ENCOUNTER — APPOINTMENT (OUTPATIENT)
Facility: CLINIC | Age: 81
End: 2026-02-24
Payer: MEDICARE

## (undated) DEVICE — TUBING SET, TRI-LUMEN, FILTERED, F/AIRSEAL

## (undated) DEVICE — SYRINGE, 30 CC, LUER LOCK

## (undated) DEVICE — Device

## (undated) DEVICE — APPLICATOR, CHLORAPREP, W/ORANGE TINT, 26ML

## (undated) DEVICE — LUBRICANT, ELECTROLUBE, F/ELECTRODE TIPS

## (undated) DEVICE — SCISSORS, MONOPOLAR, CURVED, 8MM

## (undated) DEVICE — FORCEPS, BIPOLAR MARYLAND, DAVINCI XI

## (undated) DEVICE — CARE KIT, LAPAROSCOPIC, ADVANCED

## (undated) DEVICE — SYRINGE, 30 CC, LUER SLIP TIP

## (undated) DEVICE — FORCEPS, PROGRASP, DAVINCI XI

## (undated) DEVICE — IRRIGATOR, HYDRO-SURG PLUS, WITH COJOINED SUCTION AND IRRIGATION

## (undated) DEVICE — COVER, TIP HOT SHEARS ENDOWRIST

## (undated) DEVICE — NEEDLE, CLOSURE, OMNICLOSE

## (undated) DEVICE — RESERVOIR, DRAINAGE, WOUND, JACKSON-PRATT, 100 CC, SILICONE

## (undated) DEVICE — CATHETER, FOLEY, 3WAY, 22FR, 30CC, LUBRI-SIL, LF

## (undated) DEVICE — SYRINGE, 20 CC, LUER SLIP

## (undated) DEVICE — CATHETER TRAY, SURESTEP, 16FR, URINE METER W/STATLOCK

## (undated) DEVICE — ELECTRODE, ELECTROSURGICAL, PENCIL, HAND CONTROL, BLADE, 10 FT CORD, LF

## (undated) DEVICE — DRAIN, CHANNEL W/TROCAR 15F

## (undated) DEVICE — DRAPE, INSTRUMENT, W/POUCH, STERI DRAPE, 7 X 11 IN, DISPOSABLE, STERILE

## (undated) DEVICE — OBTURATOR, BLADELESS , SU

## (undated) DEVICE — SEALANT, HEMOSTATIC, FLOSEAL, 10 ML

## (undated) DEVICE — NEEDLE, PNEUMOPERITONEUM, 120MM

## (undated) DEVICE — RETRIEVAL SYSTEM, MONARCH, 10MM DISP ENDOSCOPIC

## (undated) DEVICE — DRAPE, SHEET, THREE QUARTER, FAN FOLD, 57 X 77 IN

## (undated) DEVICE — SEAL, UNIVERSAL, 5-12MM

## (undated) DEVICE — HANDLE, PH, FOR YANKAUER SUCTION DEVICE

## (undated) DEVICE — DRIVER, NEEDLE LARGE, DAVINCI XI

## (undated) DEVICE — CAUTERY, PENCIL, PUSH BUTTON, SMOKE EVAC, 70MM

## (undated) DEVICE — SET, EXTENSION, IV, 3.9ML, 34 IN, MALE, LUER LOCK, LF

## (undated) DEVICE — IRRIGATION SET, CYSTOSCOPY, TURP, Y, CONTINUOUS, 81 IN

## (undated) DEVICE — TROCAR, OPTICAL, BLADELESS, KII FIOS, 5 X 100MM, THREADED

## (undated) DEVICE — SCISSOR TIP, ENDOCUT, LAPAROSCOPIC

## (undated) DEVICE — DRAPE, COLUMN, DAVINCI XI

## (undated) DEVICE — SLEEVE, VASO PRESS, CALF GARMENT, MEDIUM, GREEN

## (undated) DEVICE — CATHETER KIT, RADIAL ARTLINE, 20G X 1 3/4

## (undated) DEVICE — DRAPE, ARM XI

## (undated) DEVICE — APPLICATOR, ENDOSCOPIC FLOSEAL

## (undated) DEVICE — ACCESS PORT, 12MM, 120MM LENGTH, LOW PROFILE W/BLADELESS OPTICAL TIP